# Patient Record
Sex: FEMALE | Race: WHITE | Employment: PART TIME | URBAN - METROPOLITAN AREA
[De-identification: names, ages, dates, MRNs, and addresses within clinical notes are randomized per-mention and may not be internally consistent; named-entity substitution may affect disease eponyms.]

---

## 2017-01-03 ENCOUNTER — TELEPHONE (OUTPATIENT)
Dept: INTERNAL MEDICINE CLINIC | Facility: CLINIC | Age: 47
End: 2017-01-03

## 2017-01-03 RX ORDER — METRONIDAZOLE 7.5 MG/G
1 GEL VAGINAL NIGHTLY
Qty: 1 TUBE | Refills: 0 | Status: SHIPPED | OUTPATIENT
Start: 2017-01-03 | End: 2017-01-08

## 2017-01-03 NOTE — TELEPHONE ENCOUNTER
Patient aware of pap results from 12/29/16 and [facility] recommendation. HPV results in process. ..

## 2017-01-25 ENCOUNTER — APPOINTMENT (OUTPATIENT)
Dept: LAB | Age: 47
End: 2017-01-25
Attending: FAMILY MEDICINE
Payer: COMMERCIAL

## 2017-01-25 ENCOUNTER — HOSPITAL ENCOUNTER (OUTPATIENT)
Dept: MAMMOGRAPHY | Age: 47
Discharge: HOME | End: 2017-01-25
Attending: FAMILY MEDICINE
Payer: COMMERCIAL

## 2017-01-25 ENCOUNTER — OFFICE VISIT (OUTPATIENT)
Dept: INTERNAL MEDICINE CLINIC | Facility: CLINIC | Age: 47
End: 2017-01-25

## 2017-01-25 VITALS
TEMPERATURE: 98 F | BODY MASS INDEX: 31 KG/M2 | DIASTOLIC BLOOD PRESSURE: 74 MMHG | WEIGHT: 174.5 LBS | SYSTOLIC BLOOD PRESSURE: 102 MMHG | OXYGEN SATURATION: 99 % | HEART RATE: 64 BPM

## 2017-01-25 DIAGNOSIS — Z12.31 ENCOUNTER FOR SCREENING MAMMOGRAM FOR BREAST CANCER: ICD-10-CM

## 2017-01-25 DIAGNOSIS — I10 ESSENTIAL HYPERTENSION: Primary | ICD-10-CM

## 2017-01-25 PROCEDURE — 84443 ASSAY THYROID STIM HORMONE: CPT | Performed by: FAMILY MEDICINE

## 2017-01-25 PROCEDURE — 85025 COMPLETE CBC W/AUTO DIFF WBC: CPT | Performed by: FAMILY MEDICINE

## 2017-01-25 PROCEDURE — 80061 LIPID PANEL: CPT | Performed by: FAMILY MEDICINE

## 2017-01-25 PROCEDURE — 83036 HEMOGLOBIN GLYCOSYLATED A1C: CPT | Performed by: FAMILY MEDICINE

## 2017-01-25 PROCEDURE — 82306 VITAMIN D 25 HYDROXY: CPT | Performed by: FAMILY MEDICINE

## 2017-01-25 PROCEDURE — 82607 VITAMIN B-12: CPT | Performed by: FAMILY MEDICINE

## 2017-01-25 PROCEDURE — 36415 COLL VENOUS BLD VENIPUNCTURE: CPT | Performed by: FAMILY MEDICINE

## 2017-01-25 PROCEDURE — 77067 SCR MAMMO BI INCL CAD: CPT

## 2017-01-25 PROCEDURE — 99213 OFFICE O/P EST LOW 20 MIN: CPT | Performed by: FAMILY MEDICINE

## 2017-01-25 PROCEDURE — 80053 COMPREHEN METABOLIC PANEL: CPT | Performed by: FAMILY MEDICINE

## 2017-01-25 NOTE — PROGRESS NOTES
CHIEF COMPLAINT:     Patient presents with:  Blood Pressure: Follow up. HPI:   Allen Caraballo is a 51year old female   Patient presents for recheck of her hypertension.  Pt has been taking medications as instructed, no medication side effects, home Breathing is non labored. CARDIO: RRR without murmur  EXTREMITIES: no cyanosis, clubbing or edema       ASSESSMENT AND PLAN:     Essential hypertension  (primary encounter diagnosis)    No orders of the defined types were placed in this encounter.        Pastora Yanez

## 2017-01-31 DIAGNOSIS — E55.9 VITAMIN D DEFICIENCY: Primary | ICD-10-CM

## 2017-01-31 DIAGNOSIS — D64.9 ANEMIA, UNSPECIFIED TYPE: ICD-10-CM

## 2017-01-31 RX ORDER — ERGOCALCIFEROL 1.25 MG/1
50000 CAPSULE ORAL WEEKLY
Qty: 12 CAPSULE | Refills: 1 | Status: SHIPPED | OUTPATIENT
Start: 2017-01-31 | End: 2017-03-02

## 2017-02-22 ENCOUNTER — TELEPHONE (OUTPATIENT)
Dept: INTERNAL MEDICINE CLINIC | Facility: CLINIC | Age: 47
End: 2017-02-22

## 2017-02-22 NOTE — TELEPHONE ENCOUNTER
Pt called to follow up on Triamterene. Pt reported dizzy spells for the last 3-4 days. Pt inquiring if needs ov to f/u or change of dose. Please advise.

## 2017-02-23 ENCOUNTER — APPOINTMENT (OUTPATIENT)
Dept: LAB | Age: 47
End: 2017-02-23
Attending: FAMILY MEDICINE
Payer: COMMERCIAL

## 2017-02-23 ENCOUNTER — OFFICE VISIT (OUTPATIENT)
Dept: INTERNAL MEDICINE CLINIC | Facility: CLINIC | Age: 47
End: 2017-02-23

## 2017-02-23 VITALS
BODY MASS INDEX: 31 KG/M2 | DIASTOLIC BLOOD PRESSURE: 76 MMHG | SYSTOLIC BLOOD PRESSURE: 116 MMHG | WEIGHT: 174.75 LBS | OXYGEN SATURATION: 98 % | HEART RATE: 75 BPM | TEMPERATURE: 98 F

## 2017-02-23 DIAGNOSIS — I10 ESSENTIAL HYPERTENSION: ICD-10-CM

## 2017-02-23 DIAGNOSIS — R42 DIZZINESS: Primary | ICD-10-CM

## 2017-02-23 LAB
BUN BLD-MCNC: 13 MG/DL (ref 8–20)
CALCIUM BLD-MCNC: 9.8 MG/DL (ref 8.3–10.3)
CHLORIDE: 102 MMOL/L (ref 101–111)
CO2: 28 MMOL/L (ref 22–32)
CREAT BLD-MCNC: 0.8 MG/DL (ref 0.55–1.02)
GLUCOSE BLD-MCNC: 83 MG/DL (ref 70–99)
POTASSIUM SERPL-SCNC: 3.6 MMOL/L (ref 3.6–5.1)
SODIUM SERPL-SCNC: 138 MMOL/L (ref 136–144)

## 2017-02-23 PROCEDURE — 36415 COLL VENOUS BLD VENIPUNCTURE: CPT | Performed by: FAMILY MEDICINE

## 2017-02-23 PROCEDURE — 99213 OFFICE O/P EST LOW 20 MIN: CPT | Performed by: FAMILY MEDICINE

## 2017-02-23 PROCEDURE — 80048 BASIC METABOLIC PNL TOTAL CA: CPT | Performed by: FAMILY MEDICINE

## 2017-02-23 RX ORDER — MECLIZINE HCL 12.5 MG/1
12.5 TABLET ORAL 3 TIMES DAILY PRN
Qty: 30 TABLET | Refills: 0 | Status: SHIPPED | OUTPATIENT
Start: 2017-02-23 | End: 2017-03-13

## 2017-02-23 RX ORDER — IBUPROFEN 200 MG
600 TABLET ORAL EVERY 6 HOURS PRN
Status: ON HOLD | COMMUNITY
End: 2020-10-19

## 2017-02-23 NOTE — PROGRESS NOTES
CHIEF COMPLAINT:     Patient presents with:  Medication Follow-Up      HPI:   Guicho Coates is a 51year old female   Patient presents for recheck of her hypertension.  Pt has been taking medications as instructed, no medication side effects, home BP yris TMs dull, canals patent,no cerumen impaction. Nose: normal turbinates and nasal mucosa. No sinus tenderness. Throat: clear. NECK: supple, non-tender  LUNGS: clear to auscultation bilaterally, no wheezes or rhonchi. Breathing is non labored.   CARDIO: RRR w

## 2017-03-13 ENCOUNTER — OFFICE VISIT (OUTPATIENT)
Dept: INTERNAL MEDICINE CLINIC | Facility: CLINIC | Age: 47
End: 2017-03-13

## 2017-03-13 VITALS
WEIGHT: 175.5 LBS | TEMPERATURE: 98 F | BODY MASS INDEX: 32 KG/M2 | OXYGEN SATURATION: 98 % | HEART RATE: 72 BPM | SYSTOLIC BLOOD PRESSURE: 110 MMHG | DIASTOLIC BLOOD PRESSURE: 70 MMHG

## 2017-03-13 DIAGNOSIS — I10 ESSENTIAL HYPERTENSION: Primary | ICD-10-CM

## 2017-03-13 PROCEDURE — 99213 OFFICE O/P EST LOW 20 MIN: CPT | Performed by: FAMILY MEDICINE

## 2017-03-13 NOTE — H&P
CHIEF COMPLAINT:     Patient presents with:  Blood Pressure      HPI:   Ventura Astudillo is a 51year old female   Patient presents for recheck of her  hypertension. Pt has been holding her Dyzaide since her last visit and her BP has been good.  Pt has had l Breathing is non labored. CARDIO: RRR without murmur  EXTREMITIES: no cyanosis, clubbing or edema       ASSESSMENT AND PLAN:     Essential hypertension  (primary encounter diagnosis)    No orders of the defined types were placed in this encounter.        [facility]

## 2017-07-10 ENCOUNTER — HOSPITAL ENCOUNTER (OUTPATIENT)
Age: 47
Discharge: HOME | End: 2017-07-10
Payer: COMMERCIAL

## 2017-07-10 VITALS
HEART RATE: 71 BPM | SYSTOLIC BLOOD PRESSURE: 143 MMHG | WEIGHT: 175 LBS | DIASTOLIC BLOOD PRESSURE: 87 MMHG | BODY MASS INDEX: 32.2 KG/M2 | TEMPERATURE: 98 F | HEIGHT: 62 IN | RESPIRATION RATE: 16 BRPM | OXYGEN SATURATION: 99 %

## 2017-07-10 DIAGNOSIS — K12.2 UVULITIS: Primary | ICD-10-CM

## 2017-07-10 DIAGNOSIS — R06.2 WHEEZING WITHOUT DIAGNOSIS OF ASTHMA: ICD-10-CM

## 2017-07-10 DIAGNOSIS — J01.40 ACUTE PANSINUSITIS, RECURRENCE NOT SPECIFIED: ICD-10-CM

## 2017-07-10 LAB — POCT RAPID STREP: NEGATIVE

## 2017-07-10 PROCEDURE — 87081 CULTURE SCREEN ONLY: CPT | Performed by: PHYSICIAN ASSISTANT

## 2017-07-10 PROCEDURE — 87147 CULTURE TYPE IMMUNOLOGIC: CPT | Performed by: PHYSICIAN ASSISTANT

## 2017-07-10 PROCEDURE — 87430 STREP A AG IA: CPT | Performed by: PHYSICIAN ASSISTANT

## 2017-07-10 PROCEDURE — 94640 AIRWAY INHALATION TREATMENT: CPT

## 2017-07-10 PROCEDURE — 99214 OFFICE O/P EST MOD 30 MIN: CPT

## 2017-07-10 PROCEDURE — 99204 OFFICE O/P NEW MOD 45 MIN: CPT

## 2017-07-10 RX ORDER — ACETAMINOPHEN AND CODEINE PHOSPHATE 120; 12 MG/5ML; MG/5ML
10 SOLUTION ORAL EVERY 6 HOURS PRN
Qty: 200 ML | Refills: 0 | Status: SHIPPED | OUTPATIENT
Start: 2017-07-10 | End: 2018-07-17

## 2017-07-10 RX ORDER — ALBUTEROL SULFATE 90 UG/1
2 AEROSOL, METERED RESPIRATORY (INHALATION) EVERY 4 HOURS PRN
Qty: 1 INHALER | Refills: 0 | Status: SHIPPED | OUTPATIENT
Start: 2017-07-10 | End: 2017-08-09

## 2017-07-10 RX ORDER — PREDNISONE 20 MG/1
40 TABLET ORAL DAILY
Qty: 8 TABLET | Refills: 0 | Status: SHIPPED | OUTPATIENT
Start: 2017-07-10 | End: 2017-07-14

## 2017-07-10 RX ORDER — IPRATROPIUM BROMIDE AND ALBUTEROL SULFATE 2.5; .5 MG/3ML; MG/3ML
3 SOLUTION RESPIRATORY (INHALATION) ONCE
Status: COMPLETED | OUTPATIENT
Start: 2017-07-10 | End: 2017-07-10

## 2017-07-10 RX ORDER — DEXAMETHASONE SODIUM PHOSPHATE 4 MG/ML
10 VIAL (ML) INJECTION ONCE
Status: COMPLETED | OUTPATIENT
Start: 2017-07-10 | End: 2017-07-10

## 2017-07-10 RX ORDER — AZITHROMYCIN 250 MG/1
TABLET, FILM COATED ORAL
Qty: 1 PACKAGE | Refills: 0 | Status: SHIPPED | OUTPATIENT
Start: 2017-07-10 | End: 2017-07-15

## 2017-07-10 NOTE — ED PROVIDER NOTES
Patient Seen in: [facility]    History   Patient presents with:  Cough    Stated Complaint: COUGH/CONGESTION/ST/HA X 2 WKS    HPI    50 yo female here with c/o sore throat, sinus pain/pressure in tandem with headache and cough x noted in HPI. Constitutional and vital signs reviewed. All other systems reviewed and negative except as noted above. PSFH elements reviewed from today and agreed except as otherwise stated in HPI.     Physical Exam   ED Triage Vitals  BP: 133/95 [ NOTE: PT has opened up after treatment in NAD  ============================================================  ED Course  ------------------------------------------------------------  [facility]     Clinical Impression:uvulitis/wheezing/sinusitis  Course of Servando

## 2017-07-10 NOTE — ED INITIAL ASSESSMENT (HPI)
Cough - x 2 weeks, started with cold symptoms. Pt  Has been taking mucinex and ibuprofen. Denies fever. Pt states she was sick since June 28 and then went on vacation. Pt c/o headache, sore throat, cough with phlegm. Chest congestion.  Pt has h/o bronchitis

## 2017-07-12 NOTE — ED NOTES
Throat culture received, positive Strep-not group A. Given Z-pack. Spoke w/ Dr Gregorio Todd. NO further action required.

## 2018-07-17 ENCOUNTER — OFFICE VISIT (OUTPATIENT)
Dept: INTERNAL MEDICINE CLINIC | Facility: CLINIC | Age: 48
End: 2018-07-17
Payer: COMMERCIAL

## 2018-07-17 VITALS
OXYGEN SATURATION: 98 % | RESPIRATION RATE: 16 BRPM | DIASTOLIC BLOOD PRESSURE: 86 MMHG | WEIGHT: 201.5 LBS | HEART RATE: 88 BPM | SYSTOLIC BLOOD PRESSURE: 150 MMHG | BODY MASS INDEX: 37 KG/M2 | TEMPERATURE: 99 F

## 2018-07-17 DIAGNOSIS — I10 ESSENTIAL HYPERTENSION: Primary | ICD-10-CM

## 2018-07-17 DIAGNOSIS — H72.92 PERFORATED EAR DRUM, LEFT: ICD-10-CM

## 2018-07-17 DIAGNOSIS — J06.9 RECENT URI: ICD-10-CM

## 2018-07-17 PROCEDURE — 99214 OFFICE O/P EST MOD 30 MIN: CPT | Performed by: FAMILY MEDICINE

## 2018-07-17 RX ORDER — OFLOXACIN 3 MG/ML
5 SOLUTION AURICULAR (OTIC) 2 TIMES DAILY
Refills: 0 | COMMUNITY
Start: 2018-07-03 | End: 2018-08-04 | Stop reason: ALTCHOICE

## 2018-07-17 RX ORDER — TRIAMTERENE AND HYDROCHLOROTHIAZIDE 37.5; 25 MG/1; MG/1
1 CAPSULE ORAL EVERY MORNING
Qty: 30 CAPSULE | Refills: 0 | Status: SHIPPED | OUTPATIENT
Start: 2018-07-17 | End: 2018-08-04

## 2018-07-17 RX ORDER — AZITHROMYCIN 250 MG/1
1 TABLET, FILM COATED ORAL AS DIRECTED
Refills: 0 | COMMUNITY
Start: 2018-07-03 | End: 2018-08-04 | Stop reason: ALTCHOICE

## 2018-07-17 NOTE — PROGRESS NOTES
CHIEF COMPLAINT:   Patient presents with:  URI: since 6/29/18. Tympanic Membrane Perforation: Left ear~ Went to urgent care 7/3/18 in [place], [place] and was Rx'ed Z-slim x 5 days and Ofloxacin Otic Solution 0.3% x 10 days.        HPI:   The patient presents well otherwise,  normal appetite  SKIN: no rashes or abnormal skin lesions  HEENT: See HPI  LUNGS: denies shortness of breath or wheezing, See HPI  CARDIOVASCULAR: denies chest pain or palpitations   GI: denies N/V/C or abdominal pain  NEURO: occ sinus hea (DYAZIDE) 37.5-25 MG Oral Cap; Take 1 capsule by mouth every morning. Dispense: 30 capsule; Refill: 0    2. Recent URI  - resolved. Pt can use claritin or zyrtec for her allergies and flonase for the congestion as needed.   - azithromycin 250 MG Oral Tab;

## 2018-08-04 ENCOUNTER — OFFICE VISIT (OUTPATIENT)
Dept: INTERNAL MEDICINE CLINIC | Facility: CLINIC | Age: 48
End: 2018-08-04
Payer: COMMERCIAL

## 2018-08-04 VITALS
BODY MASS INDEX: 37 KG/M2 | DIASTOLIC BLOOD PRESSURE: 82 MMHG | RESPIRATION RATE: 16 BRPM | SYSTOLIC BLOOD PRESSURE: 118 MMHG | HEART RATE: 66 BPM | OXYGEN SATURATION: 99 % | TEMPERATURE: 99 F | WEIGHT: 200 LBS

## 2018-08-04 DIAGNOSIS — M79.602 LEFT ARM PAIN: ICD-10-CM

## 2018-08-04 DIAGNOSIS — I10 ESSENTIAL HYPERTENSION: Primary | ICD-10-CM

## 2018-08-04 PROCEDURE — 99214 OFFICE O/P EST MOD 30 MIN: CPT | Performed by: FAMILY MEDICINE

## 2018-08-04 RX ORDER — TRIAMTERENE AND HYDROCHLOROTHIAZIDE 37.5; 25 MG/1; MG/1
1 CAPSULE ORAL EVERY MORNING
Qty: 30 CAPSULE | Refills: 5 | Status: SHIPPED | OUTPATIENT
Start: 2018-08-04 | End: 2019-02-26

## 2018-08-04 NOTE — PROGRESS NOTES
CHIEF COMPLAINT:     Patient presents with:  Blood Pressure: follow up  Other: left arm pain recently- pt has history of shingles      HPI:   Gabi Manning is a 51year old female   Patient presents for recheck of her hypertension.  Pt has been taking med or double vision  HENT: Denies congestion, rhinorrhea, sore throat or ear pain  CHEST: Denies chest pain, or palpitations  LUNGS: Denies shortness of breath, cough, or wheezing  NEURO: see HPI      EXAM:   /82   Pulse 66   Temp 98.7 °F (37.1 °C) (Ora

## 2018-11-16 ENCOUNTER — OFFICE VISIT (OUTPATIENT)
Dept: INTERNAL MEDICINE CLINIC | Facility: CLINIC | Age: 48
End: 2018-11-16
Payer: COMMERCIAL

## 2018-11-16 VITALS
WEIGHT: 205.25 LBS | BODY MASS INDEX: 38 KG/M2 | DIASTOLIC BLOOD PRESSURE: 80 MMHG | TEMPERATURE: 98 F | RESPIRATION RATE: 16 BRPM | HEART RATE: 82 BPM | OXYGEN SATURATION: 97 % | SYSTOLIC BLOOD PRESSURE: 112 MMHG

## 2018-11-16 DIAGNOSIS — Z12.31 ENCOUNTER FOR SCREENING MAMMOGRAM FOR MALIGNANT NEOPLASM OF BREAST: ICD-10-CM

## 2018-11-16 DIAGNOSIS — Z00.00 LABORATORY EXAMINATION ORDERED AS PART OF A ROUTINE GENERAL MEDICAL EXAMINATION: Primary | ICD-10-CM

## 2018-11-16 PROCEDURE — 99213 OFFICE O/P EST LOW 20 MIN: CPT | Performed by: FAMILY MEDICINE

## 2018-11-16 NOTE — PROGRESS NOTES
CHIEF COMPLAINT:     Patient presents with:  Medication Follow-Up      HPI:   Brown Ibarra is a 51year old female   Patient presents for recheck of her hypertension.  Pt has been taking medications as instructed, no medication side effects, home BP yris lightheadedness      EXAM:   /80 (BP Location: Right arm, Patient Position: Sitting, Cuff Size: large)   Pulse 82   Temp 97.8 °F (36.6 °C) (Oral)   Resp 16   Wt 205 lb 4 oz   LMP 06/04/2018 (Approximate)   SpO2 97%   Breastfeeding?  No   BMI 37.54 kg/

## 2018-11-20 ENCOUNTER — APPOINTMENT (OUTPATIENT)
Dept: LAB | Age: 48
End: 2018-11-20
Attending: FAMILY MEDICINE
Payer: COMMERCIAL

## 2018-11-20 PROCEDURE — 83036 HEMOGLOBIN GLYCOSYLATED A1C: CPT | Performed by: FAMILY MEDICINE

## 2018-11-20 PROCEDURE — 80061 LIPID PANEL: CPT | Performed by: FAMILY MEDICINE

## 2018-11-20 PROCEDURE — 36415 COLL VENOUS BLD VENIPUNCTURE: CPT | Performed by: FAMILY MEDICINE

## 2018-11-20 PROCEDURE — 82306 VITAMIN D 25 HYDROXY: CPT | Performed by: FAMILY MEDICINE

## 2018-11-20 PROCEDURE — 80050 GENERAL HEALTH PANEL: CPT | Performed by: FAMILY MEDICINE

## 2018-11-20 PROCEDURE — 82607 VITAMIN B-12: CPT | Performed by: FAMILY MEDICINE

## 2018-11-21 ENCOUNTER — TELEPHONE (OUTPATIENT)
Dept: INTERNAL MEDICINE CLINIC | Facility: CLINIC | Age: 48
End: 2018-11-21

## 2018-11-26 NOTE — TELEPHONE ENCOUNTER
Patient calling for test results; per triage aware they keep missing each other; informed patient nurse will call right back; patient understood and confirmed at home rest of day

## 2018-11-27 DIAGNOSIS — E55.9 VITAMIN D DEFICIENCY: Primary | ICD-10-CM

## 2018-11-27 DIAGNOSIS — E78.5 SERUM LIPIDS HIGH: ICD-10-CM

## 2018-11-27 RX ORDER — ERGOCALCIFEROL 1.25 MG/1
50000 CAPSULE ORAL WEEKLY
Qty: 12 CAPSULE | Refills: 1 | Status: SHIPPED | OUTPATIENT
Start: 2018-11-27 | End: 2019-05-14

## 2019-02-26 DIAGNOSIS — I10 ESSENTIAL HYPERTENSION: ICD-10-CM

## 2019-02-26 RX ORDER — TRIAMTERENE AND HYDROCHLOROTHIAZIDE 37.5; 25 MG/1; MG/1
1 CAPSULE ORAL EVERY MORNING
Qty: 30 CAPSULE | Refills: 4 | Status: SHIPPED | OUTPATIENT
Start: 2019-02-26 | End: 2019-09-03

## 2019-02-26 NOTE — TELEPHONE ENCOUNTER
Triameterene approved per protocol  Last OV relevant to medication: 11-16-18  Last refill date: 8-4-18  #/refills: 5  When pt was asked to return for OV: 6 months  Upcoming appt/reason: none  Recent labs: 11-20-18: Vit. B12/ HgBA1C/ Lipid/ Vit. D/ Tyroid/ CM

## 2019-08-04 DIAGNOSIS — I10 ESSENTIAL HYPERTENSION: ICD-10-CM

## 2019-08-05 NOTE — TELEPHONE ENCOUNTER
Lm for pt to return phone call to schedule CPX + B/u f/u. Triamterene-hctz failed protocol due to  Hypertension Medications Protocol Failed8/4 1:33 AM   Appointment in past 6 or next 3 months   Last OV relevant to medication: 11-16-18  Last refill date: 2-26-19  #/refills: 4  When pt was asked to return for OV: 6 mo.    Upcoming appt/reason: none  Recent labs: 11-20-18: CMP

## 2019-08-07 RX ORDER — TRIAMTERENE AND HYDROCHLOROTHIAZIDE 37.5; 25 MG/1; MG/1
1 CAPSULE ORAL EVERY MORNING
Qty: 30 CAPSULE | Refills: 3 | OUTPATIENT
Start: 2019-08-07

## 2019-09-03 ENCOUNTER — OFFICE VISIT (OUTPATIENT)
Dept: INTERNAL MEDICINE CLINIC | Facility: CLINIC | Age: 49
End: 2019-09-03

## 2019-09-03 VITALS
RESPIRATION RATE: 16 BRPM | TEMPERATURE: 99 F | HEIGHT: 62 IN | BODY MASS INDEX: 36.34 KG/M2 | WEIGHT: 197.5 LBS | HEART RATE: 80 BPM | DIASTOLIC BLOOD PRESSURE: 86 MMHG | SYSTOLIC BLOOD PRESSURE: 120 MMHG | OXYGEN SATURATION: 98 %

## 2019-09-03 DIAGNOSIS — S16.1XXA STRAIN OF NECK MUSCLE, INITIAL ENCOUNTER: ICD-10-CM

## 2019-09-03 DIAGNOSIS — M25.532 LEFT WRIST PAIN: Primary | ICD-10-CM

## 2019-09-03 DIAGNOSIS — N95.1 MENOPAUSAL SYMPTOM: ICD-10-CM

## 2019-09-03 DIAGNOSIS — I10 ESSENTIAL HYPERTENSION: ICD-10-CM

## 2019-09-03 PROCEDURE — 99213 OFFICE O/P EST LOW 20 MIN: CPT | Performed by: FAMILY MEDICINE

## 2019-09-03 RX ORDER — TRIAMTERENE AND HYDROCHLOROTHIAZIDE 37.5; 25 MG/1; MG/1
1 CAPSULE ORAL EVERY MORNING
Qty: 90 CAPSULE | Refills: 0 | Status: SHIPPED | OUTPATIENT
Start: 2019-09-03 | End: 2019-11-28

## 2019-09-03 RX ORDER — NAPROXEN 500 MG/1
500 TABLET ORAL 2 TIMES DAILY WITH MEALS
Qty: 60 TABLET | Refills: 0 | Status: SHIPPED | OUTPATIENT
Start: 2019-09-03 | End: 2020-08-31

## 2019-09-03 NOTE — PROGRESS NOTES
CHIEF COMPLAINT:     Patient presents with:  Hypertension: Bp check. Pt does not check Bp. Wrist Pain: Left pain comes and goes, has been going on for about 2 months. Pt does not remember any injuries, states she cannot really bend it without any pain.  P daily with meals. Disp: 60 tablet Rfl: 0   ibuprofen 200 MG Oral Tab Take 600 mg by mouth every 6 (six) hours as needed for Pain (for headaches. ).  Disp:  Rfl:       Past Medical History:   Diagnosis Date   • Family problems 07/07/2011   • Herpes zoster wit mild  Bruising: none  Skin intact:  +  Normal sensation: +  Normal capillary refill: +  ROM:  Wrist decreased due to pain.  Normal flexion, extension  Point Tenderness: No diffuse along the ulnar side, mid forearm to the distal wrist  Radial pulses:  +2 and encounter  - Rest,ice, moist heat as needed  - naproxen 500 MG Oral Tab; Take 1 tablet (500 mg total) by mouth 2 (two) times daily with meals. Dispense: 60 tablet; Refill: 0    The patient indicates understanding of these issues and agrees to the plan.   Ella Hill

## 2019-11-28 DIAGNOSIS — I10 ESSENTIAL HYPERTENSION: ICD-10-CM

## 2019-11-29 NOTE — TELEPHONE ENCOUNTER
Voicemail box states full name, left detailed message stating she is due for CPX and lab work. If pt needs refill we can only do a partial. If needed she needs to schedule a CPX.      Triamterene-hctz 37.5-25 Mg failed protocol due to  Hypertension Medications Protocol Bwclee70/28 3:42 AM   CMP or BMP in past 12 months   Last OV relevant to medication: 9-3-19  Last refill date: 9-3-19 #/refills: 0  When pt was asked to return for OV: next several weeks for CPX  Upcoming appt/reason: none  Recent labs: none

## 2019-12-03 RX ORDER — TRIAMTERENE AND HYDROCHLOROTHIAZIDE 37.5; 25 MG/1; MG/1
1 CAPSULE ORAL EVERY MORNING
Qty: 30 CAPSULE | Refills: 0 | Status: SHIPPED
Start: 2019-12-03 | End: 2020-05-18

## 2020-02-04 ENCOUNTER — OFFICE VISIT (OUTPATIENT)
Dept: INTERNAL MEDICINE CLINIC | Facility: CLINIC | Age: 50
End: 2020-02-04

## 2020-02-04 VITALS
WEIGHT: 202.75 LBS | SYSTOLIC BLOOD PRESSURE: 126 MMHG | RESPIRATION RATE: 16 BRPM | HEIGHT: 62 IN | OXYGEN SATURATION: 97 % | BODY MASS INDEX: 37.31 KG/M2 | DIASTOLIC BLOOD PRESSURE: 84 MMHG | HEART RATE: 88 BPM | TEMPERATURE: 98 F

## 2020-02-04 DIAGNOSIS — J02.9 SORE THROAT: Primary | ICD-10-CM

## 2020-02-04 DIAGNOSIS — J03.90 TONSILLITIS WITH EXUDATE: ICD-10-CM

## 2020-02-04 LAB
CONTROL LINE PRESENT WITH A CLEAR BACKGROUND (YES/NO): YES YES/NO
STREP GRP A CUL-SCR: NEGATIVE

## 2020-02-04 PROCEDURE — 87081 CULTURE SCREEN ONLY: CPT | Performed by: FAMILY MEDICINE

## 2020-02-04 PROCEDURE — 87880 STREP A ASSAY W/OPTIC: CPT | Performed by: FAMILY MEDICINE

## 2020-02-04 PROCEDURE — 99213 OFFICE O/P EST LOW 20 MIN: CPT | Performed by: FAMILY MEDICINE

## 2020-02-04 RX ORDER — CEFDINIR 300 MG/1
300 CAPSULE ORAL 2 TIMES DAILY
Qty: 20 CAPSULE | Refills: 0 | Status: SHIPPED | OUTPATIENT
Start: 2020-02-04 | End: 2020-08-31 | Stop reason: ALTCHOICE

## 2020-02-04 NOTE — PROGRESS NOTES
CHIEF COMPLAINT:   Patient presents with:  Sore Throat: started about a week ago, pt states she has sore throat, cough, and has phlegm. Pt has some sinus pressure and ears feel clogged. Pt did notice some white spots in the back of throat.  Son was Paul Dupree SYSTEMS:   GENERAL: feels well otherwise, slightly diminished appetite  SKIN: no rashes or abnormal skin lesions  HEENT: See HPI  LUNGS: denies wheezing, See HPI  CARDIOVASCULAR: denies chest pain or palpitations   GI: denies N/V/C or abdominal pain  NEURO salt water gargles and   Rest. Tylenol/Motrin.        Meds & Refills for this Visit:  Requested Prescriptions     Signed Prescriptions Disp Refills   • cefdinir 300 MG Oral Cap 20 capsule 0     Sig: Take 1 capsule (300 mg total) by mouth 2 (two) times daily

## 2020-02-06 ENCOUNTER — TELEPHONE (OUTPATIENT)
Dept: INTERNAL MEDICINE CLINIC | Facility: CLINIC | Age: 50
End: 2020-02-06

## 2020-02-06 RX ORDER — OSELTAMIVIR PHOSPHATE 75 MG/1
75 CAPSULE ORAL 2 TIMES DAILY
Qty: 10 CAPSULE | Refills: 0 | Status: SHIPPED | OUTPATIENT
Start: 2020-02-06 | End: 2020-02-11

## 2020-02-06 NOTE — TELEPHONE ENCOUNTER
Ok for tamiflu 75 mg one bid x 5 days.  Not sure if it will help since she hs had her symptoms for more than 3 days but she can try iy

## 2020-02-06 NOTE — TELEPHONE ENCOUNTER
Pt was in this week to see Silver Rayo, she was given an antibiotic for tonsillits however  was tested positive with the flu yesterday and she states she now has the same symptoms. .    She would like Savannah to prescribe  Tamflu?      Pt is waiting a return

## 2020-05-17 DIAGNOSIS — I10 ESSENTIAL HYPERTENSION: ICD-10-CM

## 2020-05-18 RX ORDER — TRIAMTERENE AND HYDROCHLOROTHIAZIDE 37.5; 25 MG/1; MG/1
1 CAPSULE ORAL EVERY MORNING
Qty: 30 CAPSULE | Refills: 0 | Status: SHIPPED | OUTPATIENT
Start: 2020-05-18 | End: 2020-07-30

## 2020-05-18 NOTE — TELEPHONE ENCOUNTER
Triamterene/Hydrochlorothiazide 37.5-25 Mg Cap Sand  Hypertension Medications Protocol Failed5/17 3:39 AM   CMP or BMP in past 12 months   LOV: 9/3/19  RTC: several weeks for cpx   FOV: none scheduled   Filled: 12/3/19 #30, 0 refills   Recent labs: 11/20/18

## 2020-07-30 DIAGNOSIS — I10 ESSENTIAL HYPERTENSION: ICD-10-CM

## 2020-07-30 RX ORDER — TRIAMTERENE AND HYDROCHLOROTHIAZIDE 37.5; 25 MG/1; MG/1
1 CAPSULE ORAL EVERY MORNING
Qty: 30 CAPSULE | Refills: 0 | Status: SHIPPED | OUTPATIENT
Start: 2020-07-30 | End: 2020-09-11

## 2020-07-30 NOTE — TELEPHONE ENCOUNTER
LOV: 2/4/2020 with Charlene Miranda NP  RTC: no follow-up on file  Last Relevant Labs: 11/20/2018   Filled: 5/18/2020  #30 with 0 refills    No future appointments.

## 2020-08-31 ENCOUNTER — OFFICE VISIT (OUTPATIENT)
Dept: INTERNAL MEDICINE CLINIC | Facility: CLINIC | Age: 50
End: 2020-08-31
Payer: COMMERCIAL

## 2020-08-31 VITALS
BODY MASS INDEX: 38.64 KG/M2 | HEART RATE: 86 BPM | SYSTOLIC BLOOD PRESSURE: 132 MMHG | HEIGHT: 62 IN | WEIGHT: 210 LBS | RESPIRATION RATE: 16 BRPM | DIASTOLIC BLOOD PRESSURE: 90 MMHG | OXYGEN SATURATION: 98 %

## 2020-08-31 DIAGNOSIS — L72.3 SEBACEOUS CYST: ICD-10-CM

## 2020-08-31 DIAGNOSIS — M67.442 GANGLION CYST OF FINGER OF LEFT HAND: Primary | ICD-10-CM

## 2020-08-31 DIAGNOSIS — I10 ESSENTIAL HYPERTENSION: ICD-10-CM

## 2020-08-31 PROCEDURE — 99213 OFFICE O/P EST LOW 20 MIN: CPT | Performed by: FAMILY MEDICINE

## 2020-08-31 PROCEDURE — 3008F BODY MASS INDEX DOCD: CPT | Performed by: FAMILY MEDICINE

## 2020-08-31 PROCEDURE — 3080F DIAST BP >= 90 MM HG: CPT | Performed by: FAMILY MEDICINE

## 2020-08-31 PROCEDURE — 3075F SYST BP GE 130 - 139MM HG: CPT | Performed by: FAMILY MEDICINE

## 2020-08-31 NOTE — PROGRESS NOTES
Nader Fernandez is a 45year old female.   HPI:   Here for few things    15 yr ago had a cyst removed from the R ear area   Came back in the last yr or so   Does leak a bit      No meds used   NT        Also L wrist is sore   Saw Tano Valdes last yr for it

## 2020-09-10 DIAGNOSIS — I10 ESSENTIAL HYPERTENSION: ICD-10-CM

## 2020-09-11 RX ORDER — TRIAMTERENE AND HYDROCHLOROTHIAZIDE 37.5; 25 MG/1; MG/1
1 CAPSULE ORAL EVERY MORNING
Qty: 30 CAPSULE | Refills: 0 | Status: SHIPPED | OUTPATIENT
Start: 2020-09-11 | End: 2020-10-15

## 2020-09-11 NOTE — TELEPHONE ENCOUNTER
LOV: 8/31/2020 with Dr. Tamika Trevizo  RTC: \"To RTC for px\"  Last Relevant Labs: 11/20/2018   Filled: 7/30/2020  #30 with 0 refills    Future Appointments   Date Time Provider Anisa Uriarte   9/14/2020  9:10 AM Megha Bautista MD MMO NP ORTHO [facility]   9/

## 2020-09-15 ENCOUNTER — HOSPITAL ENCOUNTER (OUTPATIENT)
Dept: CT IMAGING | Age: 50
Discharge: HOME | End: 2020-09-15
Attending: OTOLARYNGOLOGY
Payer: COMMERCIAL

## 2020-09-15 DIAGNOSIS — K11.8 PAROTID MASS: ICD-10-CM

## 2020-09-15 LAB — CREAT BLD-MCNC: 0.8 MG/DL (ref 0.55–1.02)

## 2020-09-15 PROCEDURE — 70491 CT SOFT TISSUE NECK W/DYE: CPT | Performed by: OTOLARYNGOLOGY

## 2020-09-15 PROCEDURE — 82565 ASSAY OF CREATININE: CPT

## 2020-10-07 DIAGNOSIS — I10 ESSENTIAL HYPERTENSION: ICD-10-CM

## 2020-10-07 RX ORDER — TRIAMTERENE AND HYDROCHLOROTHIAZIDE 37.5; 25 MG/1; MG/1
CAPSULE ORAL
Qty: 30 CAPSULE | Refills: 0 | OUTPATIENT
Start: 2020-10-07

## 2020-10-15 ENCOUNTER — EKG ENCOUNTER (OUTPATIENT)
Dept: LAB | Facility: HOSPITAL | Age: 50
End: 2020-10-15
Payer: COMMERCIAL

## 2020-10-15 ENCOUNTER — APPOINTMENT (OUTPATIENT)
Dept: LAB | Facility: HOSPITAL | Age: 50
End: 2020-10-15
Payer: COMMERCIAL

## 2020-10-15 DIAGNOSIS — D11.0 BENIGN NEOPLASM OF PAROTID GLAND: ICD-10-CM

## 2020-10-15 DIAGNOSIS — I10 ESSENTIAL HYPERTENSION: ICD-10-CM

## 2020-10-15 PROCEDURE — 93005 ELECTROCARDIOGRAM TRACING: CPT

## 2020-10-15 PROCEDURE — 80048 BASIC METABOLIC PNL TOTAL CA: CPT

## 2020-10-15 PROCEDURE — 36415 COLL VENOUS BLD VENIPUNCTURE: CPT

## 2020-10-15 PROCEDURE — 93010 ELECTROCARDIOGRAM REPORT: CPT | Performed by: INTERNAL MEDICINE

## 2020-10-15 RX ORDER — TRIAMTERENE AND HYDROCHLOROTHIAZIDE 37.5; 25 MG/1; MG/1
1 CAPSULE ORAL EVERY MORNING
Qty: 30 CAPSULE | Refills: 2 | Status: SHIPPED | OUTPATIENT
Start: 2020-10-15 | End: 2021-01-10

## 2020-10-16 ENCOUNTER — APPOINTMENT (OUTPATIENT)
Dept: LAB | Age: 50
End: 2020-10-16
Attending: OTOLARYNGOLOGY
Payer: COMMERCIAL

## 2020-10-19 ENCOUNTER — ANESTHESIA EVENT (OUTPATIENT)
Dept: SURGERY | Facility: HOSPITAL | Age: 50
End: 2020-10-19
Payer: COMMERCIAL

## 2020-10-19 ENCOUNTER — ANESTHESIA (OUTPATIENT)
Dept: SURGERY | Facility: HOSPITAL | Age: 50
End: 2020-10-19
Payer: COMMERCIAL

## 2020-10-19 ENCOUNTER — HOSPITAL ENCOUNTER (OUTPATIENT)
Facility: HOSPITAL | Age: 50
Setting detail: HOSPITAL OUTPATIENT SURGERY
Discharge: HOME | End: 2020-10-19
Attending: OTOLARYNGOLOGY | Admitting: OTOLARYNGOLOGY
Payer: COMMERCIAL

## 2020-10-19 VITALS
RESPIRATION RATE: 18 BRPM | SYSTOLIC BLOOD PRESSURE: 123 MMHG | HEART RATE: 80 BPM | TEMPERATURE: 98 F | BODY MASS INDEX: 38.75 KG/M2 | HEIGHT: 62 IN | OXYGEN SATURATION: 96 % | DIASTOLIC BLOOD PRESSURE: 86 MMHG | WEIGHT: 210.56 LBS

## 2020-10-19 DIAGNOSIS — D11.0 BENIGN NEOPLASM OF PAROTID GLAND: Primary | ICD-10-CM

## 2020-10-19 PROCEDURE — 0CB80ZZ EXCISION OF RIGHT PAROTID GLAND, OPEN APPROACH: ICD-10-PCS | Performed by: OTOLARYNGOLOGY

## 2020-10-19 PROCEDURE — 88305 TISSUE EXAM BY PATHOLOGIST: CPT | Performed by: OTOLARYNGOLOGY

## 2020-10-19 RX ORDER — ONDANSETRON 2 MG/ML
INJECTION INTRAMUSCULAR; INTRAVENOUS AS NEEDED
Status: DISCONTINUED | OUTPATIENT
Start: 2020-10-19 | End: 2020-10-19 | Stop reason: SURG

## 2020-10-19 RX ORDER — DIPHENHYDRAMINE HYDROCHLORIDE 50 MG/ML
12.5 INJECTION INTRAMUSCULAR; INTRAVENOUS AS NEEDED
Status: DISCONTINUED | OUTPATIENT
Start: 2020-10-19 | End: 2020-10-19

## 2020-10-19 RX ORDER — SODIUM CHLORIDE, SODIUM LACTATE, POTASSIUM CHLORIDE, CALCIUM CHLORIDE 600; 310; 30; 20 MG/100ML; MG/100ML; MG/100ML; MG/100ML
INJECTION, SOLUTION INTRAVENOUS CONTINUOUS
Status: DISCONTINUED | OUTPATIENT
Start: 2020-10-19 | End: 2020-10-19

## 2020-10-19 RX ORDER — ONDANSETRON 2 MG/ML
4 INJECTION INTRAMUSCULAR; INTRAVENOUS AS NEEDED
Status: DISCONTINUED | OUTPATIENT
Start: 2020-10-19 | End: 2020-10-19

## 2020-10-19 RX ORDER — MEPERIDINE HYDROCHLORIDE 25 MG/ML
12.5 INJECTION INTRAMUSCULAR; INTRAVENOUS; SUBCUTANEOUS AS NEEDED
Status: DISCONTINUED | OUTPATIENT
Start: 2020-10-19 | End: 2020-10-19

## 2020-10-19 RX ORDER — MIDAZOLAM HYDROCHLORIDE 1 MG/ML
1 INJECTION INTRAMUSCULAR; INTRAVENOUS EVERY 5 MIN PRN
Status: DISCONTINUED | OUTPATIENT
Start: 2020-10-19 | End: 2020-10-19

## 2020-10-19 RX ORDER — ACETAMINOPHEN 500 MG
1000 TABLET ORAL ONCE
Status: DISCONTINUED | OUTPATIENT
Start: 2020-10-19 | End: 2020-10-19 | Stop reason: HOSPADM

## 2020-10-19 RX ORDER — LIDOCAINE HYDROCHLORIDE AND EPINEPHRINE 10; 10 MG/ML; UG/ML
INJECTION, SOLUTION INFILTRATION; PERINEURAL AS NEEDED
Status: DISCONTINUED | OUTPATIENT
Start: 2020-10-19 | End: 2020-10-19 | Stop reason: HOSPADM

## 2020-10-19 RX ORDER — NALOXONE HYDROCHLORIDE 0.4 MG/ML
80 INJECTION, SOLUTION INTRAMUSCULAR; INTRAVENOUS; SUBCUTANEOUS AS NEEDED
Status: DISCONTINUED | OUTPATIENT
Start: 2020-10-19 | End: 2020-10-19

## 2020-10-19 RX ORDER — HYDROMORPHONE HYDROCHLORIDE 1 MG/ML
0.4 INJECTION, SOLUTION INTRAMUSCULAR; INTRAVENOUS; SUBCUTANEOUS EVERY 5 MIN PRN
Status: DISCONTINUED | OUTPATIENT
Start: 2020-10-19 | End: 2020-10-19

## 2020-10-19 RX ORDER — HYDROCODONE BITARTRATE AND ACETAMINOPHEN 5; 325 MG/1; MG/1
1 TABLET ORAL AS NEEDED
Status: DISCONTINUED | OUTPATIENT
Start: 2020-10-19 | End: 2020-10-19

## 2020-10-19 RX ORDER — EPHEDRINE SULFATE 50 MG/ML
INJECTION, SOLUTION INTRAVENOUS AS NEEDED
Status: DISCONTINUED | OUTPATIENT
Start: 2020-10-19 | End: 2020-10-19 | Stop reason: SURG

## 2020-10-19 RX ORDER — GLYCOPYRROLATE 0.2 MG/ML
INJECTION, SOLUTION INTRAMUSCULAR; INTRAVENOUS AS NEEDED
Status: DISCONTINUED | OUTPATIENT
Start: 2020-10-19 | End: 2020-10-19 | Stop reason: SURG

## 2020-10-19 RX ORDER — LIDOCAINE HYDROCHLORIDE 10 MG/ML
INJECTION, SOLUTION EPIDURAL; INFILTRATION; INTRACAUDAL; PERINEURAL AS NEEDED
Status: DISCONTINUED | OUTPATIENT
Start: 2020-10-19 | End: 2020-10-19 | Stop reason: SURG

## 2020-10-19 RX ORDER — ACETAMINOPHEN 500 MG
1000 TABLET ORAL ONCE AS NEEDED
Status: DISCONTINUED | OUTPATIENT
Start: 2020-10-19 | End: 2020-10-19

## 2020-10-19 RX ORDER — METOCLOPRAMIDE HYDROCHLORIDE 5 MG/ML
10 INJECTION INTRAMUSCULAR; INTRAVENOUS AS NEEDED
Status: DISCONTINUED | OUTPATIENT
Start: 2020-10-19 | End: 2020-10-19

## 2020-10-19 RX ORDER — DEXAMETHASONE SODIUM PHOSPHATE 4 MG/ML
VIAL (ML) INJECTION AS NEEDED
Status: DISCONTINUED | OUTPATIENT
Start: 2020-10-19 | End: 2020-10-19 | Stop reason: SURG

## 2020-10-19 RX ORDER — REMIFENTANIL HYDROCHLORIDE 1 MG/ML
INJECTION, POWDER, LYOPHILIZED, FOR SOLUTION INTRAVENOUS AS NEEDED
Status: DISCONTINUED | OUTPATIENT
Start: 2020-10-19 | End: 2020-10-19 | Stop reason: SURG

## 2020-10-19 RX ORDER — HYDROCODONE BITARTRATE AND ACETAMINOPHEN 5; 325 MG/1; MG/1
2 TABLET ORAL AS NEEDED
Status: DISCONTINUED | OUTPATIENT
Start: 2020-10-19 | End: 2020-10-19

## 2020-10-19 RX ORDER — HYDROCODONE BITARTRATE AND ACETAMINOPHEN 5; 325 MG/1; MG/1
1 TABLET ORAL EVERY 4 HOURS PRN
Qty: 20 TABLET | Refills: 0 | Status: SHIPPED | OUTPATIENT
Start: 2020-10-19 | End: 2020-10-29

## 2020-10-19 RX ORDER — LABETALOL HYDROCHLORIDE 5 MG/ML
5 INJECTION, SOLUTION INTRAVENOUS EVERY 5 MIN PRN
Status: DISCONTINUED | OUTPATIENT
Start: 2020-10-19 | End: 2020-10-19

## 2020-10-19 RX ADMIN — REMIFENTANIL HYDROCHLORIDE 50 MCG: 1 INJECTION, POWDER, LYOPHILIZED, FOR SOLUTION INTRAVENOUS at 09:57:00

## 2020-10-19 RX ADMIN — DEXAMETHASONE SODIUM PHOSPHATE 8 MG: 4 MG/ML VIAL (ML) INJECTION at 10:03:00

## 2020-10-19 RX ADMIN — GLYCOPYRROLATE 0.4 MG: 0.2 INJECTION, SOLUTION INTRAMUSCULAR; INTRAVENOUS at 10:10:00

## 2020-10-19 RX ADMIN — SODIUM CHLORIDE, SODIUM LACTATE, POTASSIUM CHLORIDE, CALCIUM CHLORIDE: 600; 310; 30; 20 INJECTION, SOLUTION INTRAVENOUS at 10:41:00

## 2020-10-19 RX ADMIN — REMIFENTANIL HYDROCHLORIDE 100 MCG: 1 INJECTION, POWDER, LYOPHILIZED, FOR SOLUTION INTRAVENOUS at 10:02:00

## 2020-10-19 RX ADMIN — EPHEDRINE SULFATE 10 MG: 50 INJECTION, SOLUTION INTRAVENOUS at 10:13:00

## 2020-10-19 RX ADMIN — ONDANSETRON 4 MG: 2 INJECTION INTRAMUSCULAR; INTRAVENOUS at 10:26:00

## 2020-10-19 RX ADMIN — LIDOCAINE HYDROCHLORIDE 50 MG: 10 INJECTION, SOLUTION EPIDURAL; INFILTRATION; INTRACAUDAL; PERINEURAL at 09:57:00

## 2020-10-19 RX ADMIN — SODIUM CHLORIDE, SODIUM LACTATE, POTASSIUM CHLORIDE, CALCIUM CHLORIDE: 600; 310; 30; 20 INJECTION, SOLUTION INTRAVENOUS at 09:53:00

## 2020-10-19 NOTE — INTERVAL H&P NOTE
Pre-op Diagnosis: BENIGN PAROTID NEOPLASM RIGHT    The above referenced H&P was reviewed by Ja Ward MD on 10/19/2020, the patient was examined and no significant changes have occurred in the patient's condition since the H&P was performed.   I di

## 2020-10-19 NOTE — ANESTHESIA PROCEDURE NOTES
Airway  Urgency: elective      General Information and Staff    Patient location during procedure: OR  Anesthesiologist: Brina Ash MD  Resident/CRNA: Naomi Contreras CRNA  Performed: CRNA     Indications and Patient Condition  Indications for ai

## 2020-10-19 NOTE — ANESTHESIA POSTPROCEDURE EVALUATION
[facility] Patient Status:  Outpatient in a Bed   Age/Gender 45year old female MRN [id number]   [facility] SURGERY Attending Silvia Verduzco MD   Hosp Day # 0 PCP Amanda Joshua MD       Anesthesia Post-op Note    P

## 2020-10-19 NOTE — BRIEF OP NOTE
Pre-Operative Diagnosis: BENIGN PAROTID NEOPLASM RIGHT     Post-Operative Diagnosis: BENIGN PAROTID NEOPLASM RIGHT      Procedure Performed:   Procedure(s):  EXCISION OF PAROTID TUMOR OF PAROTID GLAND, LATERAL LOBE, TOTAL WITH DISSECTION AND PRESERVATION O

## 2020-10-20 NOTE — OPERATIVE REPORT
[address]    PATIENT'S NAME: [facility]   ATTENDING PHYSICIAN: Severo Kalata, M.D. OPERATING PHYSICIAN: Severo Kalata, M.D.    PATIENT ACCOUNT#:   [de-identified]    LOCATION:  [address] [place] [address] [address]  MEDICAL RECORD #:   [id number] tumor, and there was none. The wound was copiously irrigated out with saline. There was no further sign of bleeding.   It was then closed with 4-0 Vicryl suture deep and then a running subcuticular 4-0 Prolene stitch along with Steri-Strips and a sterile

## 2020-10-24 NOTE — PROGRESS NOTES
HPI:   Babar Anthony is a 45year old female who presents for a complete physical exam. Symptoms: denies discharge, itching, burning or dysuria, is menopausal, last menses was 3 years ago.  Patient complains of occ dizziness when she lies down or does [place] : 202 lb 12 oz (92 kg)  09/03/19 : 197 lb 8 oz (89.6 kg)    Body mass index is 38.14 kg/m².      Lab Results   Component Value Date    GLU 98 10/15/2020    GLU 96 11/20/2018    GLU 83 02/23/2017     Lab Results   Component Value Date    CHOLEST 194 11/20/20 Drug use: No    Occ: mgr. : no. Children: 3.    Exercise: minimal.  Diet: watches minimally     REVIEW OF SYSTEMS:   GENERAL: feels well otherwise  SKIN: denies any unusual skin lesions  EYES:denies blurred vision or double vision  HEENT: denies alba screening colonoscopy. Pt info handouts given for: exercise, low fat diet and breast self-exam. Pt' s weight is Body mass index is 38.14 kg/m². , recommended low fat diet and aerobic exercise 30 minutes three times weekly.   The patient indicates understandi

## 2020-10-26 ENCOUNTER — OFFICE VISIT (OUTPATIENT)
Dept: INTERNAL MEDICINE CLINIC | Facility: CLINIC | Age: 50
End: 2020-10-26
Payer: COMMERCIAL

## 2020-10-26 VITALS
DIASTOLIC BLOOD PRESSURE: 74 MMHG | WEIGHT: 208.5 LBS | RESPIRATION RATE: 16 BRPM | SYSTOLIC BLOOD PRESSURE: 122 MMHG | HEIGHT: 62 IN | HEART RATE: 76 BPM | TEMPERATURE: 99 F | BODY MASS INDEX: 38.37 KG/M2

## 2020-10-26 DIAGNOSIS — Z23 NEED FOR INFLUENZA VACCINATION: ICD-10-CM

## 2020-10-26 DIAGNOSIS — Z00.00 ROUTINE GENERAL MEDICAL EXAMINATION AT A HEALTH CARE FACILITY: Primary | ICD-10-CM

## 2020-10-26 DIAGNOSIS — Z12.31 ENCOUNTER FOR SCREENING MAMMOGRAM FOR BREAST CANCER: ICD-10-CM

## 2020-10-26 DIAGNOSIS — Z01.419 ENCOUNTER FOR GYNECOLOGICAL EXAMINATION WITH PAPANICOLAOU SMEAR OF CERVIX: ICD-10-CM

## 2020-10-26 PROCEDURE — 99396 PREV VISIT EST AGE 40-64: CPT | Performed by: FAMILY MEDICINE

## 2020-10-26 PROCEDURE — 3008F BODY MASS INDEX DOCD: CPT | Performed by: FAMILY MEDICINE

## 2020-10-26 PROCEDURE — 87624 HPV HI-RISK TYP POOLED RSLT: CPT | Performed by: FAMILY MEDICINE

## 2020-10-26 PROCEDURE — 90471 IMMUNIZATION ADMIN: CPT | Performed by: FAMILY MEDICINE

## 2020-10-26 PROCEDURE — 3078F DIAST BP <80 MM HG: CPT | Performed by: FAMILY MEDICINE

## 2020-10-26 PROCEDURE — 88175 CYTOPATH C/V AUTO FLUID REDO: CPT | Performed by: FAMILY MEDICINE

## 2020-10-26 PROCEDURE — 3074F SYST BP LT 130 MM HG: CPT | Performed by: FAMILY MEDICINE

## 2020-10-26 PROCEDURE — 90686 IIV4 VACC NO PRSV 0.5 ML IM: CPT | Performed by: FAMILY MEDICINE

## 2020-11-03 ENCOUNTER — LAB REQUISITION (OUTPATIENT)
Dept: LAB | Facility: HOSPITAL | Age: 50
End: 2020-11-03
Payer: COMMERCIAL

## 2020-11-03 DIAGNOSIS — M67.432 GANGLION, LEFT WRIST: ICD-10-CM

## 2020-11-03 PROCEDURE — 88304 TISSUE EXAM BY PATHOLOGIST: CPT | Performed by: ORTHOPAEDIC SURGERY

## 2020-11-09 ENCOUNTER — HOSPITAL ENCOUNTER (OUTPATIENT)
Dept: MAMMOGRAPHY | Age: 50
Discharge: HOME | End: 2020-11-09
Attending: FAMILY MEDICINE
Payer: COMMERCIAL

## 2020-11-09 DIAGNOSIS — Z12.31 ENCOUNTER FOR SCREENING MAMMOGRAM FOR BREAST CANCER: ICD-10-CM

## 2020-11-09 PROCEDURE — 77063 BREAST TOMOSYNTHESIS BI: CPT | Performed by: FAMILY MEDICINE

## 2020-11-09 PROCEDURE — 77067 SCR MAMMO BI INCL CAD: CPT | Performed by: FAMILY MEDICINE

## 2020-11-20 ENCOUNTER — APPOINTMENT (OUTPATIENT)
Dept: LAB | Age: 50
End: 2020-11-20
Attending: INTERNAL MEDICINE
Payer: COMMERCIAL

## 2020-11-20 DIAGNOSIS — Z01.818 PRE-OP TESTING: ICD-10-CM

## 2020-11-23 PROBLEM — Z80.0 FH: COLON CANCER: Status: ACTIVE | Noted: 2020-11-23

## 2021-01-10 DIAGNOSIS — I10 ESSENTIAL HYPERTENSION: ICD-10-CM

## 2021-01-10 RX ORDER — TRIAMTERENE AND HYDROCHLOROTHIAZIDE 37.5; 25 MG/1; MG/1
1 CAPSULE ORAL EVERY MORNING
Qty: 30 CAPSULE | Refills: 0 | Status: SHIPPED | OUTPATIENT
Start: 2021-01-10 | End: 2021-02-07

## 2021-02-07 DIAGNOSIS — I10 ESSENTIAL HYPERTENSION: ICD-10-CM

## 2021-02-07 RX ORDER — TRIAMTERENE AND HYDROCHLOROTHIAZIDE 37.5; 25 MG/1; MG/1
1 CAPSULE ORAL EVERY MORNING
Qty: 30 CAPSULE | Refills: 2 | Status: SHIPPED | OUTPATIENT
Start: 2021-02-07 | End: 2021-06-25

## 2021-04-29 DIAGNOSIS — I10 ESSENTIAL HYPERTENSION: ICD-10-CM

## 2021-04-29 RX ORDER — TRIAMTERENE AND HYDROCHLOROTHIAZIDE 37.5; 25 MG/1; MG/1
1 CAPSULE ORAL EVERY MORNING
Qty: 30 CAPSULE | Refills: 0 | OUTPATIENT
Start: 2021-04-29

## 2021-06-25 ENCOUNTER — TELEPHONE (OUTPATIENT)
Dept: INTERNAL MEDICINE CLINIC | Facility: CLINIC | Age: 51
End: 2021-06-25

## 2021-06-25 DIAGNOSIS — I10 ESSENTIAL HYPERTENSION: ICD-10-CM

## 2021-06-25 RX ORDER — TRIAMTERENE AND HYDROCHLOROTHIAZIDE 37.5; 25 MG/1; MG/1
1 CAPSULE ORAL EVERY MORNING
Qty: 30 CAPSULE | Refills: 0 | Status: SHIPPED | OUTPATIENT
Start: 2021-06-25 | End: 2021-07-20

## 2021-06-25 NOTE — TELEPHONE ENCOUNTER
Patient called in advising the pharmacy denied her refill. Patient made an appointment to come in July 20th 2021 to see Madelin Romano for a medication f/u. The patient is currently out of town and cannot come into the office.  Patient is requesting refill on med

## 2021-07-20 ENCOUNTER — OFFICE VISIT (OUTPATIENT)
Dept: INTERNAL MEDICINE CLINIC | Facility: CLINIC | Age: 51
End: 2021-07-20
Payer: COMMERCIAL

## 2021-07-20 VITALS
HEART RATE: 65 BPM | DIASTOLIC BLOOD PRESSURE: 82 MMHG | BODY MASS INDEX: 37.32 KG/M2 | TEMPERATURE: 98 F | WEIGHT: 208 LBS | SYSTOLIC BLOOD PRESSURE: 120 MMHG | HEIGHT: 62.5 IN | OXYGEN SATURATION: 99 % | RESPIRATION RATE: 12 BRPM

## 2021-07-20 DIAGNOSIS — I10 ESSENTIAL HYPERTENSION: ICD-10-CM

## 2021-07-20 PROCEDURE — 3079F DIAST BP 80-89 MM HG: CPT | Performed by: FAMILY MEDICINE

## 2021-07-20 PROCEDURE — 99213 OFFICE O/P EST LOW 20 MIN: CPT | Performed by: FAMILY MEDICINE

## 2021-07-20 PROCEDURE — 3074F SYST BP LT 130 MM HG: CPT | Performed by: FAMILY MEDICINE

## 2021-07-20 PROCEDURE — 3008F BODY MASS INDEX DOCD: CPT | Performed by: FAMILY MEDICINE

## 2021-07-20 RX ORDER — TRIAMTERENE AND HYDROCHLOROTHIAZIDE 37.5; 25 MG/1; MG/1
1 CAPSULE ORAL EVERY MORNING
Qty: 90 CAPSULE | Refills: 0 | Status: SHIPPED | OUTPATIENT
Start: 2021-07-20 | End: 2021-10-20

## 2021-07-20 NOTE — PROGRESS NOTES
CHIEF COMPLAINT:     Patient presents with:  Medication Follow-Up      HPI:   Molly Dow is a 45year old female   Patient presents for recheck of their hypertension.  Pt has been taking medications as instructed, no medication side effects, home BP lesions  HEAD: atraumatic, normocephalic  EYES: conjunctiva clear, EOM intact, PERRLA  HEENT: ears,nose and throat clear  NECK: supple, non-tender  LUNGS: clear to auscultation bilaterally, no wheezes or rhonchi. Breathing is non labored.   CARDIO: RRR with

## 2021-07-24 DIAGNOSIS — I10 ESSENTIAL HYPERTENSION: ICD-10-CM

## 2021-07-24 RX ORDER — TRIAMTERENE AND HYDROCHLOROTHIAZIDE 37.5; 25 MG/1; MG/1
1 CAPSULE ORAL EVERY MORNING
Qty: 30 CAPSULE | Refills: 0 | OUTPATIENT
Start: 2021-07-24

## 2021-09-09 ENCOUNTER — HOSPITAL ENCOUNTER (OUTPATIENT)
Age: 51
Discharge: HOME | End: 2021-09-09
Payer: COMMERCIAL

## 2021-09-09 VITALS
TEMPERATURE: 99 F | BODY MASS INDEX: 38.64 KG/M2 | HEIGHT: 62 IN | DIASTOLIC BLOOD PRESSURE: 90 MMHG | OXYGEN SATURATION: 96 % | SYSTOLIC BLOOD PRESSURE: 141 MMHG | RESPIRATION RATE: 16 BRPM | WEIGHT: 210 LBS | HEART RATE: 79 BPM

## 2021-09-09 DIAGNOSIS — R21 RASH: Primary | ICD-10-CM

## 2021-09-09 PROCEDURE — 99203 OFFICE O/P NEW LOW 30 MIN: CPT

## 2021-09-09 PROCEDURE — 99213 OFFICE O/P EST LOW 20 MIN: CPT

## 2021-09-09 RX ORDER — METHYLPREDNISOLONE 4 MG/1
TABLET ORAL
Qty: 1 EACH | Refills: 0 | Status: SHIPPED | OUTPATIENT
Start: 2021-09-09 | End: 2021-09-16

## 2021-09-09 NOTE — ED INITIAL ASSESSMENT (HPI)
Pt presents tonight with c/o rash to her chest and bilateral legs since Wednesday morning. Pt denies using any new products. Pt states that she has had shingles in the past and she knows that is what this is.  Pt states that she has also been having left ey

## 2021-09-10 NOTE — ED PROVIDER NOTES
Patient Seen in: [facility]      History   Patient presents with:  Rash    Stated Complaint: c/o Shingles    HPI/Subjective:   HPI    CHIEF COMPLAINT: Rash     HISTORY OF PRESENT ILLNESS: Patient is a 42-year-old female who presents for e Alcohol use: Yes      Comment: Occasional    Drug use: Never             Review of Systems    Positive for stated complaint: c/o Shingles  Other systems are as noted in HPI. Constitutional and vital signs reviewed.       All other systems reviewed and nega contacts. She states she has had shingles in the past and this is similar, however on exam the rash is diffuse, spanning multiple dermatomes. There is no ulcerations or vesicles noted. More likely a contact dermatitis versus pityriasis rosea.   No herald

## 2021-09-13 ENCOUNTER — APPOINTMENT (OUTPATIENT)
Dept: GENERAL RADIOLOGY | Facility: HOSPITAL | Age: 51
DRG: 076 | End: 2021-09-13
Attending: EMERGENCY MEDICINE
Payer: COMMERCIAL

## 2021-09-13 ENCOUNTER — APPOINTMENT (OUTPATIENT)
Dept: CT IMAGING | Facility: HOSPITAL | Age: 51
DRG: 076 | End: 2021-09-13
Attending: EMERGENCY MEDICINE
Payer: COMMERCIAL

## 2021-09-13 ENCOUNTER — HOSPITAL ENCOUNTER (INPATIENT)
Facility: HOSPITAL | Age: 51
LOS: 3 days | Discharge: HOME | DRG: 076 | End: 2021-09-16
Attending: EMERGENCY MEDICINE | Admitting: HOSPITALIST
Payer: COMMERCIAL

## 2021-09-13 DIAGNOSIS — G03.9 MENINGITIS: Primary | ICD-10-CM

## 2021-09-13 LAB
ADENOVIRUS PCR:: NOT DETECTED
ALBUMIN SERPL-MCNC: 3.3 G/DL (ref 3.4–5)
ALBUMIN/GLOB SERPL: 0.6 {RATIO} (ref 1–2)
ALP LIVER SERPL-CCNC: 55 U/L
ALT SERPL-CCNC: 26 U/L
ANION GAP SERPL CALC-SCNC: 9 MMOL/L (ref 0–18)
AST SERPL-CCNC: 9 U/L (ref 15–37)
B PARAPERT DNA SPEC QL NAA+PROBE: NOT DETECTED
B PERT DNA SPEC QL NAA+PROBE: NOT DETECTED
BASOPHILS # BLD AUTO: 0.02 X10(3) UL (ref 0–0.2)
BASOPHILS NFR BLD AUTO: 0.2 %
BASOPHILS NFR CSF: 0 %
BILIRUB SERPL-MCNC: 0.6 MG/DL (ref 0.1–2)
BILIRUB UR QL STRIP.AUTO: NEGATIVE
BUN BLD-MCNC: 21 MG/DL (ref 7–18)
C PNEUM DNA SPEC QL NAA+PROBE: NOT DETECTED
CALCIUM BLD-MCNC: 11 MG/DL (ref 8.5–10.1)
CHLORIDE SERPL-SCNC: 100 MMOL/L (ref 98–112)
CLARITY CSF: CLEAR
CO2 SERPL-SCNC: 29 MMOL/L (ref 21–32)
COLOR CSF: COLORLESS
COLOR UR AUTO: YELLOW
CORONAVIRUS 229E PCR:: NOT DETECTED
CORONAVIRUS HKU1 PCR:: NOT DETECTED
CORONAVIRUS NL63 PCR:: NOT DETECTED
CORONAVIRUS OC43 PCR:: NOT DETECTED
COUNT PERFORMED ON TUBE: 4
CREAT BLD-MCNC: 0.93 MG/DL
EOSINOPHIL # BLD AUTO: 0.01 X10(3) UL (ref 0–0.7)
EOSINOPHIL NFR BLD AUTO: 0.1 %
EOSINOPHIL NFR CSF: 0 %
ERYTHROCYTE [DISTWIDTH] IN BLOOD BY AUTOMATED COUNT: 13.1 %
FLUAV RNA SPEC QL NAA+PROBE: NOT DETECTED
FLUBV RNA SPEC QL NAA+PROBE: NOT DETECTED
GLOBULIN PLAS-MCNC: 6 G/DL (ref 2.8–4.4)
GLUCOSE BLD-MCNC: 126 MG/DL (ref 70–99)
GLUCOSE CSF-MCNC: 64 MG/DL (ref 40–70)
GLUCOSE UR STRIP.AUTO-MCNC: NEGATIVE MG/DL
HCT VFR BLD AUTO: 38.4 %
HGB BLD-MCNC: 12.8 G/DL
IMM GRANULOCYTES # BLD AUTO: 0.06 X10(3) UL (ref 0–1)
IMM GRANULOCYTES NFR BLD: 0.5 %
LEUKOCYTE ESTERASE UR QL STRIP.AUTO: NEGATIVE
LYMPHOCYTES # BLD AUTO: 1.68 X10(3) UL (ref 1–4)
LYMPHOCYTES NFR BLD AUTO: 13.2 %
LYMPHOCYTES NFR CSF: 87 %
M PROTEIN MFR SERPL ELPH: 9.3 G/DL (ref 6.4–8.2)
MCH RBC QN AUTO: 32.9 PG (ref 26–34)
MCHC RBC AUTO-ENTMCNC: 33.3 G/DL (ref 31–37)
MCV RBC AUTO: 98.7 FL
METAPNEUMOVIRUS PCR:: NOT DETECTED
MONOCYTES # BLD AUTO: 0.83 X10(3) UL (ref 0.1–1)
MONOCYTES NFR BLD AUTO: 6.5 %
MONOS+MACROS NFR CSF: 1 %
MONOSCREEN: NEGATIVE
MYCOPLASMA PNEUMONIA PCR:: NOT DETECTED
NEUTROPHILS # BLD AUTO: 10.09 X10 (3) UL (ref 1.5–7.7)
NEUTROPHILS # BLD AUTO: 10.09 X10(3) UL (ref 1.5–7.7)
NEUTROPHILS NFR BLD AUTO: 79.5 %
NEUTROPHILS NFR CSF: 12 %
NITRITE UR QL STRIP.AUTO: NEGATIVE
OSMOLALITY SERPL CALC.SUM OF ELEC: 291 MOSM/KG (ref 275–295)
PARAINFLUENZA 1 PCR:: NOT DETECTED
PARAINFLUENZA 2 PCR:: NOT DETECTED
PARAINFLUENZA 3 PCR:: NOT DETECTED
PARAINFLUENZA 4 PCR:: NOT DETECTED
PH UR STRIP.AUTO: 5 [PH] (ref 5–8)
PLATELET # BLD AUTO: 198 10(3)UL (ref 150–450)
POTASSIUM SERPL-SCNC: 3.4 MMOL/L (ref 3.5–5.1)
PROT PATTERN CSF ELPH-IMP: 63.3 MG/DL (ref 15–45)
PROT UR STRIP.AUTO-MCNC: 100 MG/DL
PTH-INTACT SERPL-MCNC: 35.8 PG/ML (ref 18.5–88)
RBC # BLD AUTO: 3.89 X10(6)UL
RBC # CSF: 14 /MM3 (ref ?–1)
RHINOVIRUS/ENTERO PCR:: NOT DETECTED
RSV RNA SPEC QL NAA+PROBE: NOT DETECTED
SARS-COV-2 RNA NPH QL NAA+NON-PROBE: NOT DETECTED
SODIUM SERPL-SCNC: 138 MMOL/L (ref 136–145)
SP GR UR STRIP.AUTO: >1.03 (ref 1–1.03)
TOTAL CELLS COUNTED: 100
TOTAL VOLUME CSF: 11.5 ML
UROBILINOGEN UR STRIP.AUTO-MCNC: <2 MG/DL
WBC # BLD AUTO: 12.7 X10(3) UL (ref 4–11)
WBC # FLD MANUAL: 42 /MM3 (ref 0–5)

## 2021-09-13 PROCEDURE — 00JU3ZZ INSPECTION OF SPINAL CANAL, PERCUTANEOUS APPROACH: ICD-10-PCS | Performed by: EMERGENCY MEDICINE

## 2021-09-13 PROCEDURE — B01BZZZ FLUOROSCOPY OF SPINAL CORD: ICD-10-PCS | Performed by: RADIOLOGY

## 2021-09-13 PROCEDURE — 70450 CT HEAD/BRAIN W/O DYE: CPT | Performed by: EMERGENCY MEDICINE

## 2021-09-13 PROCEDURE — 71046 X-RAY EXAM CHEST 2 VIEWS: CPT | Performed by: EMERGENCY MEDICINE

## 2021-09-13 PROCEDURE — 009U3ZX DRAINAGE OF SPINAL CANAL, PERCUTANEOUS APPROACH, DIAGNOSTIC: ICD-10-PCS | Performed by: RADIOLOGY

## 2021-09-13 PROCEDURE — 62328 DX LMBR SPI PNXR W/FLUOR/CT: CPT | Performed by: EMERGENCY MEDICINE

## 2021-09-13 PROCEDURE — 99223 1ST HOSP IP/OBS HIGH 75: CPT | Performed by: HOSPITALIST

## 2021-09-13 RX ORDER — SODIUM CHLORIDE 9 MG/ML
INJECTION, SOLUTION INTRAVENOUS CONTINUOUS
Status: DISCONTINUED | OUTPATIENT
Start: 2021-09-13 | End: 2021-09-13

## 2021-09-13 RX ORDER — ACETAMINOPHEN 500 MG
1000 TABLET ORAL EVERY 6 HOURS PRN
Status: DISCONTINUED | OUTPATIENT
Start: 2021-09-13 | End: 2021-09-16

## 2021-09-13 RX ORDER — ONDANSETRON 2 MG/ML
4 INJECTION INTRAMUSCULAR; INTRAVENOUS EVERY 6 HOURS PRN
Status: DISCONTINUED | OUTPATIENT
Start: 2021-09-13 | End: 2021-09-16

## 2021-09-13 RX ORDER — VALACYCLOVIR HYDROCHLORIDE 500 MG/1
1000 TABLET, FILM COATED ORAL EVERY 8 HOURS SCHEDULED
Status: DISCONTINUED | OUTPATIENT
Start: 2021-09-13 | End: 2021-09-15

## 2021-09-13 RX ORDER — HYDROMORPHONE HYDROCHLORIDE 1 MG/ML
0.4 INJECTION, SOLUTION INTRAMUSCULAR; INTRAVENOUS; SUBCUTANEOUS EVERY 2 HOUR PRN
Status: DISCONTINUED | OUTPATIENT
Start: 2021-09-13 | End: 2021-09-16

## 2021-09-13 RX ORDER — SODIUM CHLORIDE 9 MG/ML
INJECTION, SOLUTION INTRAVENOUS CONTINUOUS
Status: DISCONTINUED | OUTPATIENT
Start: 2021-09-13 | End: 2021-09-16

## 2021-09-13 RX ORDER — POTASSIUM CHLORIDE 20 MEQ/1
20 TABLET, EXTENDED RELEASE ORAL ONCE
Status: COMPLETED | OUTPATIENT
Start: 2021-09-13 | End: 2021-09-13

## 2021-09-13 RX ORDER — ACETAMINOPHEN 500 MG
1000 TABLET ORAL ONCE
Status: COMPLETED | OUTPATIENT
Start: 2021-09-13 | End: 2021-09-13

## 2021-09-13 RX ORDER — HYDROMORPHONE HYDROCHLORIDE 1 MG/ML
0.5 INJECTION, SOLUTION INTRAMUSCULAR; INTRAVENOUS; SUBCUTANEOUS EVERY 30 MIN PRN
Status: DISCONTINUED | OUTPATIENT
Start: 2021-09-13 | End: 2021-09-13

## 2021-09-13 RX ORDER — PROCHLORPERAZINE EDISYLATE 5 MG/ML
5 INJECTION INTRAMUSCULAR; INTRAVENOUS EVERY 8 HOURS PRN
Status: DISCONTINUED | OUTPATIENT
Start: 2021-09-13 | End: 2021-09-14 | Stop reason: DRUGHIGH

## 2021-09-13 RX ORDER — HYDROMORPHONE HYDROCHLORIDE 1 MG/ML
0.8 INJECTION, SOLUTION INTRAMUSCULAR; INTRAVENOUS; SUBCUTANEOUS EVERY 2 HOUR PRN
Status: DISCONTINUED | OUTPATIENT
Start: 2021-09-13 | End: 2021-09-15

## 2021-09-13 RX ORDER — ENOXAPARIN SODIUM 100 MG/ML
40 INJECTION SUBCUTANEOUS DAILY
Status: DISCONTINUED | OUTPATIENT
Start: 2021-09-14 | End: 2021-09-16

## 2021-09-13 RX ORDER — ONDANSETRON 2 MG/ML
4 INJECTION INTRAMUSCULAR; INTRAVENOUS ONCE
Status: COMPLETED | OUTPATIENT
Start: 2021-09-13 | End: 2021-09-13

## 2021-09-13 RX ORDER — HYDROMORPHONE HYDROCHLORIDE 1 MG/ML
0.2 INJECTION, SOLUTION INTRAMUSCULAR; INTRAVENOUS; SUBCUTANEOUS EVERY 2 HOUR PRN
Status: DISCONTINUED | OUTPATIENT
Start: 2021-09-13 | End: 2021-09-16

## 2021-09-13 RX ORDER — ONDANSETRON 2 MG/ML
4 INJECTION INTRAMUSCULAR; INTRAVENOUS EVERY 4 HOURS PRN
Status: DISCONTINUED | OUTPATIENT
Start: 2021-09-13 | End: 2021-09-13

## 2021-09-13 RX ORDER — KETOROLAC TROMETHAMINE 30 MG/ML
30 INJECTION, SOLUTION INTRAMUSCULAR; INTRAVENOUS ONCE
Status: COMPLETED | OUTPATIENT
Start: 2021-09-13 | End: 2021-09-13

## 2021-09-13 RX ORDER — KETOROLAC TROMETHAMINE 30 MG/ML
30 INJECTION, SOLUTION INTRAMUSCULAR; INTRAVENOUS EVERY 6 HOURS PRN
Status: DISPENSED | OUTPATIENT
Start: 2021-09-13 | End: 2021-09-15

## 2021-09-13 RX ORDER — KETOROLAC TROMETHAMINE 15 MG/ML
15 INJECTION, SOLUTION INTRAMUSCULAR; INTRAVENOUS EVERY 6 HOURS PRN
Status: DISPENSED | OUTPATIENT
Start: 2021-09-13 | End: 2021-09-15

## 2021-09-13 RX ORDER — ONDANSETRON 2 MG/ML
4 INJECTION INTRAMUSCULAR; INTRAVENOUS
Status: COMPLETED | OUTPATIENT
Start: 2021-09-13 | End: 2021-09-13

## 2021-09-13 RX ORDER — ONDANSETRON 2 MG/ML
INJECTION INTRAMUSCULAR; INTRAVENOUS
Status: COMPLETED
Start: 2021-09-13 | End: 2021-09-13

## 2021-09-13 NOTE — ED QUICK NOTES
Patient and  informed for lp by dr David Looney ,.. pain meds given  Equipment gathered  .  Consent signed

## 2021-09-13 NOTE — ED INITIAL ASSESSMENT (HPI)
Pt has been ill for a few days went to urgent care and was started on steroids on sat.   Pt cont to have headache, fatigue nausea and vomiting

## 2021-09-13 NOTE — CONSULTS
INFECTIOUS DISEASE CONSULTATION    Celia De La Rosa Patient Status:  Emergency    1970 MRN [id number]   Location [address] Attending No att. providers found   Psychiatric Day # 0.9% NaCl infusion, , Intravenous, Continuous  •  HYDROmorphone HCl (DILAUDID) 1 MG/ML injection 0.5 mg, 0.5 mg, Intravenous, Q30 Min PRN  •  ondansetron (ZOFRAN) injection 4 mg, 4 mg, Intravenous, Q4H PRN  No current facility-administered medications on f Smear This is a cytocentrifuged smear.  N/A     Cell Count, CSF [[id number]] (Abnormal) Collected: 09/13/21 1152   Specimen: Cerebral spinal fluid from CSF, lumbar puncture Updated: 09/13/21 1312    Total Volume CSF 11.5 mL     CSF TUBE # 4    [facility]  [phone]

## 2021-09-13 NOTE — ED PROVIDER NOTES
Patient Seen in: [facility] Emergency Department      History   Patient presents with:  Nausea/Vomiting/Diarrhea  Dizziness    Stated Complaint: nvd, dizzy     Subjective:   HPI    Patient presented to urgent care 4 days ago.   Patient was complainin reviewed. All other systems reviewed and negative except as noted above.     Physical Exam     ED Triage Vitals [09/13/21 0747]   BP (!) 140/94   Pulse 88   Resp 20   Temp 99.4 °F (37.4 °C)   Temp src Temporal   SpO2 92 %   O2 Device None (Room air) 100  (*)     WBC Urine 6-10 (*)     RBC Urine 6-10 (*)     Bacteria Urine Rare (*)     Squamous Epi. Cells Few (*)     All other components within normal limits   PROTEIN, TOTAL, CSF - Abnormal; Notable for the following components:     Total Protein CSF 63 CBC W/ DIFFERENTIAL[[id number]]          Abnormal            Final result                 Please view results for these tests on the individual orders.    CELLCOUNT/DIFF CSF   EBV, CHRONIC/ACTIVE INFECTION   VIRAL ENCEPHALITIS PANEL, CSF    Narrative: negative  Respiratory panel negative for all including Covid  Urinalysis shows high specific gravity with ketones consistent with dehydration observe clinically.  There were negative nitrites and leukocytes    Chest x-ray  CONCLUSION:  No active cardiopulmo encounter diagnosis)     Disposition:  Admit  9/13/2021  2:05 pm    Follow-up:  No follow-up provider specified.         Medications Prescribed:  Current Discharge Medication List                          Hospital Problems             Present on Admission

## 2021-09-13 NOTE — PROGRESS NOTES
NURSING ADMISSION NOTE      Patient admitted via Cart  Oriented to room. Safety precautions initiated. Bed in low position. Call light in reach. Patient admitted in stable condition from ED. Admission navigator completed with patient.    Pt is AOx

## 2021-09-13 NOTE — PROCEDURES
[facility]  Procedure Note    Prudence Ace Patient Status:  Emergency    1970 MRN [id number]   Location [address] Attending Robyn Reed MD   Hosp Day # 0 PCP Jett Celis MD     FINDINGS:   LEVEL PUNCTURED: L

## 2021-09-13 NOTE — H&P
[facility]  History and Physical     Natalie Santos Patient Status:  Inpatient    1970 MRN [id number]   [facility] 5NW-A Attending Liya Mclaughlin MD   Hosp Day # 0 PCP Carri Zhou MD     Chief Complaint: ROSAS     History 0  Triamterene-HCTZ 37.5-25 MG Oral Cap, Take 1 capsule by mouth every morning., Disp: 90 capsule, Rfl: 0      Physical Exam:    BP (!) 159/98   Pulse 89   Temp 99.4 °F (37.4 °C) (Temporal)   Resp 19   SpO2 94%   General: No acute distress.  Alert and orien

## 2021-09-14 PROCEDURE — 99232 SBSQ HOSP IP/OBS MODERATE 35: CPT | Performed by: HOSPITALIST

## 2021-09-14 RX ORDER — PROCHLORPERAZINE EDISYLATE 5 MG/ML
10 INJECTION INTRAMUSCULAR; INTRAVENOUS EVERY 6 HOURS PRN
Status: DISCONTINUED | OUTPATIENT
Start: 2021-09-14 | End: 2021-09-16

## 2021-09-14 NOTE — PLAN OF CARE
Received patient alert and orientated. Oxygen WNL on room air-2L NC, . Pt with complaints of headaches, PRN pain medications given. Pt with complaints of nausea, medications given. Voiding. Up with assistance.  Instructed to call for help, call light wit

## 2021-09-14 NOTE — PLAN OF CARE
Pt A&Ox4. Sats > 90% on RA, Prn 2L needed after giving IV pain medications. Pt is photosensitive and light sensitive. Q8 vitals, have been stable. Lovenox. PRN pain and nausea medications given with some relief.  Pt is able to move around the room with SB. Progressing

## 2021-09-14 NOTE — PROGRESS NOTES
[facility]                INFECTIOUS DISEASE PROGRESS NOTE    Muna Reyes Patient Status:  Inpatient    1970 MRN [id number]   [facility] 5NW-A Attending Iliana Birch MD   Hosp Day # 1 PCP MD Emily Rainey (Preliminary result)    Collection Time: 09/13/21 11:52 AM    Specimen: CSF, lumbar puncture; Cerebral spinal fluid   Result Value Ref Range    CSF Culture Result No Growth at 18-24 hrs.  N/A    CSF Smear 2+ WBCs seen N/A    CSF Smear No organisms seen N/A

## 2021-09-14 NOTE — PROGRESS NOTES
[facility]  Progress Note     Prudence Ace Patient Status:  Inpatient    1970 MRN [id number]   [facility] 5NW-A Attending Tc Locke MD   Hosp Day # 1 PCP Jett Celis MD     Chief Complaint: headache   S:  Still v Imaging: Imaging data reviewed in Epic. Medications:   • enoxaparin  40 mg Subcutaneous Daily   • valACYclovir  1,000 mg Oral Q8H [address]     ASSESSMENT / PLAN:   1. Viral meningitis with photophobia   1. ID consult appreciated  2.  F/u LP Cx- enterovirus, W

## 2021-09-14 NOTE — PAYOR COMM NOTE
--------------  ADMISSION REVIEW     Payor: Omaira Simmons [facility] #:  [id number]  Authorization Number: [id number]       ED Provider Notes          History   Patient presents with:  Nausea/Vomiting/Diarrhea  Dizziness    Stated Co 0747]   BP (!) 140/94   Pulse 88   Resp 20   Temp 99.4 °F (37.4 °C)   Temp src Temporal   SpO2 92 %   O2 Device None (Room air)       Current:/84   Pulse 75   Temp 99.4 °F (37.4 °C) (Temporal)   Resp 18   SpO2 96%         Physical Exam  General: The other components within normal limits   PROTEIN, TOTAL, CSF - Abnormal; Notable for the following components:     Total Protein CSF 63.3 (*)     All other components within normal limits   CELL COUNT, CSF - Abnormal; Notable for the following components: x-ray  CONCLUSION:  No active cardiopulmonary process identified. CT brain  CONCLUSION:  No acute intracranial abnormality identified. I reviewed the results of the work-up with the patient.   As noted above, with her headache, stiff neck, and gurvinder any severe ear pain or rashes that look like herpes. Review of Systems:   A comprehensive 14 point review of systems was completed. Pertinent positives and negatives noted in the HPI.     Physical Exam:    BP (!) 159/98   Pulse 89   Temp 99.4 °F (37.4 role for antibitoics  Since HSV is pending reasonable to cover with po valtrex           9/14 HOSP    Still very nauseated but can keep down liquids. Still with HA     1. Viral meningitis with photophobia   1. ID consult appreciated  2.  F/u LP Cx- enterovi RN      Prochlorperazine Edisylate (COMPAZINE) injection 10 mg     Date Action Dose Route User    9/14/2021 1033 Given  Intravenous Jeronimo Jaquez RN      0.9% NaCl infusion 125 ml/hr    Date Action Dose Route User    9/13/2021 7082 Mak Dean

## 2021-09-15 LAB
ANION GAP SERPL CALC-SCNC: 7 MMOL/L (ref 0–18)
BUN BLD-MCNC: 8 MG/DL (ref 7–18)
CALCIUM BLD-MCNC: 9.9 MG/DL (ref 8.5–10.1)
CHLORIDE SERPL-SCNC: 104 MMOL/L (ref 98–112)
CO2 SERPL-SCNC: 27 MMOL/L (ref 21–32)
CREAT BLD-MCNC: 0.53 MG/DL
CRYPTOCOCCUS NEOFORMANS GATTII BY PCR: NOT DETECTED
CYTOMEGALVIRUS BY PCR: NOT DETECTED
EBV NA IGG SER QL IA: POSITIVE
EBV VCA IGG SER QL IA: POSITIVE
EBV VCA IGM SER QL IA: NEGATIVE
ENTEROVIRUS BY PCR: NOT DETECTED
ESCHERICHIA COLI K1 BY PCR: NOT DETECTED
GLUCOSE BLD-MCNC: 99 MG/DL (ref 70–99)
HAEMOPHILUS INFLUENZAE BY PCR: NOT DETECTED
HERPES SIMPLEX VIRUS 1 BY PCR: NOT DETECTED
HERPES SIMPLEX VIRUS 2 BY PCR: NOT DETECTED
HUMAN HERPESVIRUS 6 BY PCR: NOT DETECTED
HUMAN PARECHOVIRUS BY PCR: NOT DETECTED
LISTERIA MONOCYTOGENES BY PCR: NOT DETECTED
MAGNESIUM SERPL-MCNC: 2.3 MG/DL (ref 1.6–2.6)
NEISSERIA MENINGITIDIS BY PCR: NOT DETECTED
OSMOLALITY SERPL CALC.SUM OF ELEC: 284 MOSM/KG (ref 275–295)
POTASSIUM SERPL-SCNC: 2.8 MMOL/L (ref 3.5–5.1)
POTASSIUM SERPL-SCNC: 3.3 MMOL/L (ref 3.5–5.1)
SODIUM SERPL-SCNC: 138 MMOL/L (ref 136–145)
STREPTOCOCCUS AGALACTIAE BY PCR: NOT DETECTED
STREPTOCOCCUS PNEUMONIAE BY PCR: NOT DETECTED
VARICELLA ZOSTER VIRUS BY PCR: NOT DETECTED

## 2021-09-15 PROCEDURE — 99232 SBSQ HOSP IP/OBS MODERATE 35: CPT | Performed by: HOSPITALIST

## 2021-09-15 RX ORDER — TRAMADOL HYDROCHLORIDE 50 MG/1
50 TABLET ORAL EVERY 6 HOURS PRN
Status: DISCONTINUED | OUTPATIENT
Start: 2021-09-15 | End: 2021-09-16

## 2021-09-15 RX ORDER — ONDANSETRON 4 MG/1
4 TABLET, FILM COATED ORAL EVERY 8 HOURS PRN
Status: DISCONTINUED | OUTPATIENT
Start: 2021-09-15 | End: 2021-09-16

## 2021-09-15 RX ORDER — POTASSIUM CHLORIDE 20 MEQ/1
40 TABLET, EXTENDED RELEASE ORAL EVERY 4 HOURS
Status: COMPLETED | OUTPATIENT
Start: 2021-09-15 | End: 2021-09-15

## 2021-09-15 NOTE — PLAN OF CARE
Pt A&Ox4. Sats > 90% on RA. Pt is photosensitive, light sensitive and has a slight headache. Q8 vitals, have been stable. Lovenox. PRN oral pain and nausea medications given with some relief. Pt is able to move around the room with SB.  Reg diet pt is melissa goals for specific interventions  Outcome: Progressing     Problem: RISK FOR INFECTION - ADULT  Goal: Absence of fever/infection during anticipated neutropenic period  Description: INTERVENTIONS  - Monitor WBC  - Administer growth factors as ordered  - Imp

## 2021-09-15 NOTE — PROGRESS NOTES
[facility]                INFECTIOUS DISEASE PROGRESS NOTE    Reynaldo Stovall Patient Status:  Inpatient    1970 MRN [id number]   [facility] 5NW-A Attending Tree Washington MD   Hosp Day # 2 PCP Mojgan Murphy MD       Dairl Cooler 9.3*  --   --        No results found for: [facility] Encounter on 09/13/21   1.  CSF culture     Status: None (Preliminary result)    Collection Time: 09/13/21 11:52 AM    Specimen: CSF, lumbar puncture; Cerebral spinal fluid   Result

## 2021-09-15 NOTE — PLAN OF CARE
Problem: Meningitis   Data: Ax04. No tele.  , on room air overnight. Afebrile. Verbalized general pain , primarily headache. Light sensitivity. IV dilaudid and Toradol given. Regular diet , poor appetite. No complaints of nausea, no emesis. IVF. Up SBA.

## 2021-09-15 NOTE — PROGRESS NOTES
[facility]     Hospitalist Progress Note     Glorious Pinta Patient Status:  Inpatient    1970 MRN [id number]   [facility] 5NW-A Attending Fern Ngo MD   Hosp Day # 2 PCP Adi Sung MD     Chief Complaint: Romeo Dietrich Markers  No results for input(s): CRP, GEMMA, LDH, DDIMER in the last 168 hours. Imaging: Imaging data reviewed in Epic.     Medications:   • potassium chloride  40 mEq Oral Q4H   • enoxaparin  40 mg Subcutaneous Daily   • valACYclovir  1,000 mg Oral Q8H S

## 2021-09-16 VITALS
OXYGEN SATURATION: 97 % | DIASTOLIC BLOOD PRESSURE: 78 MMHG | WEIGHT: 205.5 LBS | RESPIRATION RATE: 16 BRPM | HEART RATE: 75 BPM | BODY MASS INDEX: 38 KG/M2 | TEMPERATURE: 98 F | SYSTOLIC BLOOD PRESSURE: 130 MMHG

## 2021-09-16 LAB
ENTEROVIRUS DETECTION BY RT-PC: NOT DETECTED
ENTEROVIRUS DETECTION BY RT-PC: NOT DETECTED

## 2021-09-16 PROCEDURE — 99239 HOSP IP/OBS DSCHRG MGMT >30: CPT | Performed by: HOSPITALIST

## 2021-09-16 RX ORDER — HYDROCODONE BITARTRATE AND ACETAMINOPHEN 10; 325 MG/1; MG/1
1 TABLET ORAL EVERY 4 HOURS PRN
Status: DISCONTINUED | OUTPATIENT
Start: 2021-09-16 | End: 2021-09-16

## 2021-09-16 RX ORDER — DOCUSATE SODIUM 100 MG/1
100 CAPSULE, LIQUID FILLED ORAL 2 TIMES DAILY
Qty: 30 CAPSULE | Refills: 0 | Status: SHIPPED | OUTPATIENT
Start: 2021-09-16

## 2021-09-16 RX ORDER — HYDROCODONE BITARTRATE AND ACETAMINOPHEN 10; 325 MG/1; MG/1
1 TABLET ORAL EVERY 4 HOURS PRN
Qty: 20 TABLET | Refills: 0 | Status: SHIPPED | OUTPATIENT
Start: 2021-09-16

## 2021-09-16 NOTE — PLAN OF CARE
Patient alert and oriented x4. Up with standby. No tele ordered. VSS. Maintaining o2 sats on RA. C/o severe headache, prn dilaudid given with relief. Patient with photosensitivity and light sensitivity. IVF infusing. PO Valtrex discontinued.  Updated patien

## 2021-09-16 NOTE — PROGRESS NOTES
[facility]     Hospitalist Progress Note     [facility] Patient Status:  Inpatient    1970 MRN [id number]   [facility] 5NW-A Attending Radha Cobos MD   Hosp Day # 3 PCP Anson Queen MD     Chief Complaint: meningitis input(s): CRP, GEMMA, LDH, DDIMER in the last 168 hours. Imaging: Imaging data reviewed in Epic. Medications:   • enoxaparin  40 mg Subcutaneous Daily       Assessment & Plan:    1.  Viral meningitis  · ID following  · WNV pending  · HSV and enterovirus neg

## 2021-09-16 NOTE — DISCHARGE SUMMARY
[facility] HOSPITALIST  DISCHARGE SUMMARY     Rain Avalos Patient Status:  Inpatient    1970 MRN [id number]   [facility] 5NW-A Attending No att. providers found   Hosp Day # 3 PCP Eduard Bullard MD     Date of Admission: 2021  Da START taking these medications      Instructions Prescription details   docusate sodium 100 MG Caps  Commonly known as: COLACE      Take 1 capsule (100 mg total) by mouth 2 (two) times daily.    Quantity: 30 capsule  Refills: 0     HYDROcodone-acetaminoph

## 2021-09-16 NOTE — DISCHARGE PLANNING
NURSING DISCHARGE NOTE    Discharged Home via Wheelchair. Accompanied by Spouse and Support staff  Belongings Taken by patient/family. PIV removed. Discharge navigator complete. Discharge instructions reviewed with patient & spouse at bedside.    All

## 2021-09-17 ENCOUNTER — TELEPHONE (OUTPATIENT)
Dept: INTERNAL MEDICINE CLINIC | Facility: CLINIC | Age: 51
End: 2021-09-17

## 2021-09-17 ENCOUNTER — PATIENT OUTREACH (OUTPATIENT)
Dept: CASE MANAGEMENT | Age: 51
End: 2021-09-17

## 2021-09-17 DIAGNOSIS — Z02.9 ENCOUNTERS FOR ADMINISTRATIVE PURPOSE: ICD-10-CM

## 2021-09-17 LAB
HSV 1 SUBTYPE BY PCR: NOT DETECTED
HSV 2 SUBTYPE BY PCR: NOT DETECTED
WEST NILE VIRUS AB IGG CSF: 1.14 IV
WEST NILE VIRUS AB IGM CSF: 4.64 IV

## 2021-09-17 NOTE — TELEPHONE ENCOUNTER
Kenyatta from THE [facility] lab would like to speak with the nurse of Dr. Carmelita Whitfield regarding some labs that were done at THE [facility] ER. Please advise.

## 2021-09-17 NOTE — TELEPHONE ENCOUNTER
Spoke with Tory at THE [facility] lab stating patient's [place] IGM lab result was high 4.64, range is less then 0.89. please advise.

## 2021-09-18 LAB
WEST NILE VIRUS AB IGG CSF: 0.47 IV
WEST NILE VIRUS AB IGM CSF: 4.31 IV

## 2021-09-20 NOTE — TELEPHONE ENCOUNTER
Spoke with patient stating continues to feel fatigue, continues to have pain but has improved a lot, as well as headache pain has improved. Patient is taking Hydrocodone which helps with pain and sleep, patient only has 5-6 tablets left. Please advise.

## 2021-09-20 NOTE — TELEPHONE ENCOUNTER
Spoke with patient advised symptoms should continue to improve in the next couple weeks, patient to continue present management. Patient verbalized understanding and agreeable to POC.         please reach out to patient to schedule f/u with TO.

## 2021-09-21 NOTE — TELEPHONE ENCOUNTER
Pt called back and scheduled -     Future Appointments   Date Time Provider Anisa Uriarte   9/23/2021  8:30 AM Rolando Estrada MD [facility] [place]

## 2021-09-23 ENCOUNTER — OFFICE VISIT (OUTPATIENT)
Dept: INTERNAL MEDICINE CLINIC | Facility: CLINIC | Age: 51
End: 2021-09-23
Payer: COMMERCIAL

## 2021-09-23 VITALS
OXYGEN SATURATION: 97 % | RESPIRATION RATE: 18 BRPM | BODY MASS INDEX: 36.22 KG/M2 | DIASTOLIC BLOOD PRESSURE: 88 MMHG | WEIGHT: 196.81 LBS | SYSTOLIC BLOOD PRESSURE: 108 MMHG | HEIGHT: 62 IN | HEART RATE: 84 BPM

## 2021-09-23 DIAGNOSIS — E66.9 OBESITY (BMI 30-39.9): ICD-10-CM

## 2021-09-23 DIAGNOSIS — A92.31 WEST NILE VIRUS ENCEPHALITIS: Primary | ICD-10-CM

## 2021-09-23 LAB
WEST NILE VIRUS AB, IGG, SER: 0.56 IV
WEST NILE VIRUS AB, IGM, SER: 4.87 IV

## 2021-09-23 PROCEDURE — 3074F SYST BP LT 130 MM HG: CPT | Performed by: FAMILY MEDICINE

## 2021-09-23 PROCEDURE — 99495 TRANSJ CARE MGMT MOD F2F 14D: CPT | Performed by: FAMILY MEDICINE

## 2021-09-23 PROCEDURE — 3079F DIAST BP 80-89 MM HG: CPT | Performed by: FAMILY MEDICINE

## 2021-09-23 PROCEDURE — 3008F BODY MASS INDEX DOCD: CPT | Performed by: FAMILY MEDICINE

## 2021-09-23 NOTE — PROGRESS NOTES
HPI:    Tiffani Loya is a 45year old female here today for hospital follow up.    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

## 2021-09-27 ENCOUNTER — TELEPHONE (OUTPATIENT)
Dept: INTERNAL MEDICINE CLINIC | Facility: CLINIC | Age: 51
End: 2021-09-27

## 2021-09-27 NOTE — TELEPHONE ENCOUNTER
Pt seen 9/23/21 for HFU, West Nile Virus    Pt called requesting return to work note to return 10/6 to be sent through [employer]     Pt also provided condition update, headaches and appetite has improved.  Pt stated she feels 50% better, still has low energy

## 2021-09-28 NOTE — PROGRESS NOTES
Several attempts made to reach the patient with no return call. Patient completed HFU on 9/23/2021. Closing encounter.

## 2021-10-20 DIAGNOSIS — I10 ESSENTIAL HYPERTENSION: ICD-10-CM

## 2021-10-20 RX ORDER — TRIAMTERENE AND HYDROCHLOROTHIAZIDE 37.5; 25 MG/1; MG/1
1 CAPSULE ORAL EVERY MORNING
Qty: 90 CAPSULE | Refills: 0 | Status: SHIPPED | OUTPATIENT
Start: 2021-10-20

## 2021-10-20 NOTE — TELEPHONE ENCOUNTER
Triamterene-hydrochlorothiazide 37.5-25 mg  Filled 7-20-21  Qty 90  0 refills  No upcoming appt.    LOV 7-20-21

## 2021-11-29 ENCOUNTER — TELEMEDICINE (OUTPATIENT)
Dept: INTERNAL MEDICINE CLINIC | Facility: CLINIC | Age: 51
End: 2021-11-29

## 2021-11-29 ENCOUNTER — LAB ENCOUNTER (OUTPATIENT)
Dept: LAB | Age: 51
End: 2021-11-29
Attending: FAMILY MEDICINE
Payer: COMMERCIAL

## 2021-11-29 DIAGNOSIS — J06.9 UPPER RESPIRATORY TRACT INFECTION, UNSPECIFIED TYPE: ICD-10-CM

## 2021-11-29 DIAGNOSIS — J01.90 ACUTE NON-RECURRENT SINUSITIS, UNSPECIFIED LOCATION: Primary | ICD-10-CM

## 2021-11-29 PROCEDURE — 99213 OFFICE O/P EST LOW 20 MIN: CPT | Performed by: FAMILY MEDICINE

## 2021-11-29 RX ORDER — AZITHROMYCIN 250 MG/1
TABLET, FILM COATED ORAL
Qty: 6 TABLET | Refills: 0 | Status: SHIPPED | OUTPATIENT
Start: 2021-11-29 | End: 2021-12-04

## 2021-11-29 NOTE — PROGRESS NOTES
Celia De La Rosa is a 45year old female.   HPI:   Patient has agreed to do a video encounter w me today in lieu of a regular appointment in regards to her URI for the last week   Had ST  Runny nose   HAs  Slight cough   Has PND    Has R ear pain now and c

## 2021-12-01 ENCOUNTER — TELEPHONE (OUTPATIENT)
Dept: INTERNAL MEDICINE CLINIC | Facility: CLINIC | Age: 51
End: 2021-12-01

## 2021-12-01 NOTE — TELEPHONE ENCOUNTER
Patient calling regarding her covid results, they were released to her via [employer], patient has concerns and would like recommendations.   Ph. [phone]

## 2022-04-05 RX ORDER — TRIAMTERENE AND HYDROCHLOROTHIAZIDE 37.5; 25 MG/1; MG/1
1 CAPSULE ORAL EVERY MORNING
Qty: 12 CAPSULE | Refills: 0 | Status: SHIPPED | OUTPATIENT
Start: 2022-04-05

## 2022-04-05 NOTE — TELEPHONE ENCOUNTER
Scheduled appt. For pt. Needs short refill    Triamterene-hydrochlorothiazide 37.5- 25 mg  Hypertension Medications Protocol Failed 04/05/2022 11:44 AM   Protocol Details  Appointment in past 6 or next 3 months   Filled 10-20-21  Qty 90  0 refills  No upcoming appt.   LOV 7-20-21 20

## 2022-04-18 ENCOUNTER — OFFICE VISIT (OUTPATIENT)
Dept: INTERNAL MEDICINE CLINIC | Facility: CLINIC | Age: 52
End: 2022-04-18
Payer: COMMERCIAL

## 2022-04-18 VITALS
TEMPERATURE: 98 F | HEART RATE: 69 BPM | BODY MASS INDEX: 37.14 KG/M2 | DIASTOLIC BLOOD PRESSURE: 80 MMHG | HEIGHT: 62 IN | OXYGEN SATURATION: 99 % | RESPIRATION RATE: 18 BRPM | WEIGHT: 201.81 LBS | SYSTOLIC BLOOD PRESSURE: 128 MMHG

## 2022-04-18 DIAGNOSIS — I10 ESSENTIAL HYPERTENSION: ICD-10-CM

## 2022-04-18 DIAGNOSIS — I10 PRIMARY HYPERTENSION: ICD-10-CM

## 2022-04-18 DIAGNOSIS — Z12.31 VISIT FOR SCREENING MAMMOGRAM: ICD-10-CM

## 2022-04-18 DIAGNOSIS — Z00.00 WELLNESS EXAMINATION: Primary | ICD-10-CM

## 2022-04-18 DIAGNOSIS — Z00.00 LABORATORY EXAM ORDERED AS PART OF ROUTINE GENERAL MEDICAL EXAMINATION: ICD-10-CM

## 2022-04-18 DIAGNOSIS — F43.9 STRESS: ICD-10-CM

## 2022-04-18 PROBLEM — J03.90 TONSILLITIS WITH EXUDATE: Status: RESOLVED | Noted: 2020-02-04 | Resolved: 2022-04-18

## 2022-04-18 PROCEDURE — 99396 PREV VISIT EST AGE 40-64: CPT | Performed by: FAMILY MEDICINE

## 2022-04-18 PROCEDURE — 3079F DIAST BP 80-89 MM HG: CPT | Performed by: FAMILY MEDICINE

## 2022-04-18 PROCEDURE — 3074F SYST BP LT 130 MM HG: CPT | Performed by: FAMILY MEDICINE

## 2022-04-18 PROCEDURE — 3008F BODY MASS INDEX DOCD: CPT | Performed by: FAMILY MEDICINE

## 2022-04-18 RX ORDER — TRIAMTERENE AND HYDROCHLOROTHIAZIDE 37.5; 25 MG/1; MG/1
1 CAPSULE ORAL EVERY MORNING
Qty: 90 CAPSULE | Refills: 1 | Status: SHIPPED | OUTPATIENT
Start: 2022-04-18

## 2022-04-18 RX ORDER — ALPRAZOLAM 0.5 MG/1
0.5 TABLET ORAL 2 TIMES DAILY PRN
Qty: 30 TABLET | Refills: 0 | Status: SHIPPED
Start: 2022-04-18

## 2022-04-18 RX ORDER — TRIAMTERENE AND HYDROCHLOROTHIAZIDE 37.5; 25 MG/1; MG/1
1 CAPSULE ORAL EVERY MORNING
Qty: 12 CAPSULE | Refills: 0 | Status: CANCELLED | OUTPATIENT
Start: 2022-04-18

## 2022-04-18 RX ORDER — ACETAMINOPHEN AND CODEINE PHOSPHATE 300; 30 MG/1; MG/1
1-2 TABLET ORAL
COMMUNITY
Start: 2022-02-08

## 2022-07-04 RX ORDER — ALPRAZOLAM 0.5 MG/1
TABLET ORAL
Qty: 30 TABLET | Refills: 0 | Status: SHIPPED | OUTPATIENT
Start: 2022-07-04

## 2022-09-12 PROCEDURE — 87086 URINE CULTURE/COLONY COUNT: CPT | Performed by: OBSTETRICS & GYNECOLOGY

## 2022-10-03 ENCOUNTER — HOSPITAL ENCOUNTER (OUTPATIENT)
Dept: MAMMOGRAPHY | Age: 52
Discharge: HOME | End: 2022-10-03
Attending: FAMILY MEDICINE
Payer: COMMERCIAL

## 2022-10-03 DIAGNOSIS — Z12.31 VISIT FOR SCREENING MAMMOGRAM: ICD-10-CM

## 2022-10-03 PROCEDURE — 77063 BREAST TOMOSYNTHESIS BI: CPT | Performed by: FAMILY MEDICINE

## 2022-10-03 PROCEDURE — 77067 SCR MAMMO BI INCL CAD: CPT | Performed by: FAMILY MEDICINE

## 2022-10-13 DIAGNOSIS — I10 ESSENTIAL HYPERTENSION: ICD-10-CM

## 2022-10-19 RX ORDER — TRIAMTERENE AND HYDROCHLOROTHIAZIDE 37.5; 25 MG/1; MG/1
1 CAPSULE ORAL EVERY MORNING
Qty: 30 CAPSULE | Refills: 0 | Status: SHIPPED | OUTPATIENT
Start: 2022-10-19 | End: 2022-12-15

## 2023-03-03 DIAGNOSIS — I10 ESSENTIAL HYPERTENSION: ICD-10-CM

## 2023-03-03 RX ORDER — TRIAMTERENE AND HYDROCHLOROTHIAZIDE 37.5; 25 MG/1; MG/1
1 CAPSULE ORAL EVERY MORNING
Qty: 30 CAPSULE | Refills: 0 | Status: SHIPPED | OUTPATIENT
Start: 2023-03-03

## 2023-03-03 NOTE — TELEPHONE ENCOUNTER
Triamterene-hydrochlorothiazide 37.5-25 mg   Hypertension Medications Protocol Failed 03/03/2023 07:20 AM   Protocol Details  CMP or BMP in past 12 months    Appointment in past 6 or next 3 months      Filled 12-15-22  Qty 30  0 refills  No upcoming appt.   LOV 4-18-22 TO

## 2023-04-06 DIAGNOSIS — I10 ESSENTIAL HYPERTENSION: ICD-10-CM

## 2023-04-06 RX ORDER — TRIAMTERENE AND HYDROCHLOROTHIAZIDE 37.5; 25 MG/1; MG/1
CAPSULE ORAL
Qty: 30 CAPSULE | Refills: 0 | Status: SHIPPED | OUTPATIENT
Start: 2023-04-06

## 2023-04-17 ENCOUNTER — OFFICE VISIT (OUTPATIENT)
Dept: INTERNAL MEDICINE CLINIC | Facility: CLINIC | Age: 53
End: 2023-04-17
Payer: COMMERCIAL

## 2023-04-17 VITALS
SYSTOLIC BLOOD PRESSURE: 128 MMHG | BODY MASS INDEX: 36.57 KG/M2 | WEIGHT: 203.81 LBS | OXYGEN SATURATION: 99 % | TEMPERATURE: 98 F | DIASTOLIC BLOOD PRESSURE: 82 MMHG | HEART RATE: 81 BPM | HEIGHT: 62.5 IN

## 2023-04-17 DIAGNOSIS — I10 PRIMARY HYPERTENSION: Primary | ICD-10-CM

## 2023-04-17 DIAGNOSIS — Z00.00 LABORATORY EXAM ORDERED AS PART OF ROUTINE GENERAL MEDICAL EXAMINATION: ICD-10-CM

## 2023-04-17 DIAGNOSIS — I10 ESSENTIAL HYPERTENSION: ICD-10-CM

## 2023-04-17 RX ORDER — TRIAMTERENE AND HYDROCHLOROTHIAZIDE 37.5; 25 MG/1; MG/1
CAPSULE ORAL
Qty: 90 CAPSULE | Refills: 1 | Status: SHIPPED | OUTPATIENT
Start: 2023-04-17

## 2023-04-18 ENCOUNTER — LAB ENCOUNTER (OUTPATIENT)
Dept: LAB | Age: 53
End: 2023-04-18
Attending: FAMILY MEDICINE
Payer: COMMERCIAL

## 2023-04-18 DIAGNOSIS — Z00.00 LABORATORY EXAM ORDERED AS PART OF ROUTINE GENERAL MEDICAL EXAMINATION: ICD-10-CM

## 2023-04-18 LAB
ALBUMIN SERPL-MCNC: 3.2 G/DL (ref 3.4–5)
ALBUMIN/GLOB SERPL: 0.6 {RATIO} (ref 1–2)
ALP LIVER SERPL-CCNC: 55 U/L
ALT SERPL-CCNC: 33 U/L
ANION GAP SERPL CALC-SCNC: 3 MMOL/L (ref 0–18)
AST SERPL-CCNC: 21 U/L (ref 15–37)
BASOPHILS # BLD AUTO: 0.03 X10(3) UL (ref 0–0.2)
BASOPHILS NFR BLD AUTO: 0.6 %
BILIRUB SERPL-MCNC: 0.2 MG/DL (ref 0.1–2)
BILIRUB UR QL STRIP.AUTO: NEGATIVE
BUN BLD-MCNC: 15 MG/DL (ref 7–18)
CALCIUM BLD-MCNC: 10.8 MG/DL (ref 8.5–10.1)
CHLORIDE SERPL-SCNC: 104 MMOL/L (ref 98–112)
CHOLEST SERPL-MCNC: 177 MG/DL (ref ?–200)
CO2 SERPL-SCNC: 30 MMOL/L (ref 21–32)
COLOR UR AUTO: YELLOW
CREAT BLD-MCNC: 0.83 MG/DL
EOSINOPHIL # BLD AUTO: 0.09 X10(3) UL (ref 0–0.7)
EOSINOPHIL NFR BLD AUTO: 1.8 %
ERYTHROCYTE [DISTWIDTH] IN BLOOD BY AUTOMATED COUNT: 14.6 %
EST. AVERAGE GLUCOSE BLD GHB EST-MCNC: 128 MG/DL (ref 68–126)
FASTING PATIENT LIPID ANSWER: YES
FASTING STATUS PATIENT QL REPORTED: YES
GFR SERPLBLD BASED ON 1.73 SQ M-ARVRAT: 84 ML/MIN/1.73M2 (ref 60–?)
GLOBULIN PLAS-MCNC: 5.6 G/DL (ref 2.8–4.4)
GLUCOSE BLD-MCNC: 107 MG/DL (ref 70–99)
GLUCOSE UR STRIP.AUTO-MCNC: NEGATIVE MG/DL
HBA1C MFR BLD: 6.1 % (ref ?–5.7)
HCT VFR BLD AUTO: 33.6 %
HDLC SERPL-MCNC: 52 MG/DL (ref 40–59)
HGB BLD-MCNC: 11.3 G/DL
IMM GRANULOCYTES # BLD AUTO: 0 X10(3) UL (ref 0–1)
IMM GRANULOCYTES NFR BLD: 0 %
KETONES UR STRIP.AUTO-MCNC: NEGATIVE MG/DL
LDLC SERPL CALC-MCNC: 98 MG/DL (ref ?–100)
LEUKOCYTE ESTERASE UR QL STRIP.AUTO: NEGATIVE
LYMPHOCYTES # BLD AUTO: 2.42 X10(3) UL (ref 1–4)
LYMPHOCYTES NFR BLD AUTO: 47.3 %
MCH RBC QN AUTO: 32.8 PG (ref 26–34)
MCHC RBC AUTO-ENTMCNC: 33.6 G/DL (ref 31–37)
MCV RBC AUTO: 97.7 FL
MONOCYTES # BLD AUTO: 0.43 X10(3) UL (ref 0.1–1)
MONOCYTES NFR BLD AUTO: 8.4 %
NEUTROPHILS # BLD AUTO: 2.15 X10 (3) UL (ref 1.5–7.7)
NEUTROPHILS # BLD AUTO: 2.15 X10(3) UL (ref 1.5–7.7)
NEUTROPHILS NFR BLD AUTO: 41.9 %
NITRITE UR QL STRIP.AUTO: NEGATIVE
NONHDLC SERPL-MCNC: 125 MG/DL (ref ?–130)
OSMOLALITY SERPL CALC.SUM OF ELEC: 285 MOSM/KG (ref 275–295)
PH UR STRIP.AUTO: 7 [PH] (ref 5–8)
PLATELET # BLD AUTO: 238 10(3)UL (ref 150–450)
POTASSIUM SERPL-SCNC: 3.6 MMOL/L (ref 3.5–5.1)
PROT SERPL-MCNC: 8.8 G/DL (ref 6.4–8.2)
PROT UR STRIP.AUTO-MCNC: 30 MG/DL
RBC # BLD AUTO: 3.44 X10(6)UL
SODIUM SERPL-SCNC: 137 MMOL/L (ref 136–145)
SP GR UR STRIP.AUTO: 1.02 (ref 1–1.03)
TRIGL SERPL-MCNC: 158 MG/DL (ref 30–149)
TSI SER-ACNC: 0.8 MIU/ML (ref 0.36–3.74)
UROBILINOGEN UR STRIP.AUTO-MCNC: <2 MG/DL
VLDLC SERPL CALC-MCNC: 26 MG/DL (ref 0–30)
WBC # BLD AUTO: 5.1 X10(3) UL (ref 4–11)

## 2023-04-18 PROCEDURE — 80053 COMPREHEN METABOLIC PANEL: CPT

## 2023-04-18 PROCEDURE — 81001 URINALYSIS AUTO W/SCOPE: CPT | Performed by: FAMILY MEDICINE

## 2023-04-18 PROCEDURE — 85025 COMPLETE CBC W/AUTO DIFF WBC: CPT

## 2023-04-18 PROCEDURE — 80061 LIPID PANEL: CPT

## 2023-04-18 PROCEDURE — 84443 ASSAY THYROID STIM HORMONE: CPT

## 2023-04-18 PROCEDURE — 83036 HEMOGLOBIN GLYCOSYLATED A1C: CPT | Performed by: FAMILY MEDICINE

## 2023-04-18 PROCEDURE — 36415 COLL VENOUS BLD VENIPUNCTURE: CPT

## 2023-04-19 DIAGNOSIS — R73.09 ELEVATED GLUCOSE: ICD-10-CM

## 2023-04-19 DIAGNOSIS — D64.9 ANEMIA, UNSPECIFIED TYPE: ICD-10-CM

## 2023-04-19 DIAGNOSIS — R77.9 ELEVATED BLOOD PROTEIN: Primary | ICD-10-CM

## 2023-06-05 ENCOUNTER — HOSPITAL ENCOUNTER (OUTPATIENT)
Age: 53
Discharge: HOME | End: 2023-06-05
Payer: COMMERCIAL

## 2023-06-05 VITALS
TEMPERATURE: 98 F | SYSTOLIC BLOOD PRESSURE: 130 MMHG | RESPIRATION RATE: 20 BRPM | HEART RATE: 80 BPM | DIASTOLIC BLOOD PRESSURE: 83 MMHG | OXYGEN SATURATION: 98 %

## 2023-06-05 DIAGNOSIS — J01.91 ACUTE RECURRENT SINUSITIS, UNSPECIFIED LOCATION: ICD-10-CM

## 2023-06-05 DIAGNOSIS — H66.90 ACUTE OTITIS MEDIA, UNSPECIFIED OTITIS MEDIA TYPE: Primary | ICD-10-CM

## 2023-06-05 PROCEDURE — 99213 OFFICE O/P EST LOW 20 MIN: CPT

## 2023-06-05 PROCEDURE — 99214 OFFICE O/P EST MOD 30 MIN: CPT

## 2023-06-05 RX ORDER — AMOXICILLIN AND CLAVULANATE POTASSIUM 875; 125 MG/1; MG/1
1 TABLET, FILM COATED ORAL 2 TIMES DAILY
Qty: 20 TABLET | Refills: 0 | Status: SHIPPED | OUTPATIENT
Start: 2023-06-05 | End: 2023-06-15

## 2023-06-05 NOTE — DISCHARGE INSTRUCTIONS
Take antibiotics as directed and to completion  Avoid having water run into or sit in your ear  Do not submerge your ear in water  Do not put anything in your ear farther than you can see     Wash hands often  Disinfect your environment, linens, electronics, etc.  Drink plenty of fluids (water, Pedialyte, etc.)  Get plenty of rest  Do not share utensils or drinks  Salt water gargles throughout the day for sore throat  Alternate Ibuprofen (adult: 600mg) and Tylenol (adult: 650-1000mg) as needed for pain / body aches / fever  You may benefit from taking a decongestant (e.g. Sudafed - pseudoephedrine [behind the pharmacy counter])  You may benefit from using a humidifier and/or steam showers  You may benefit from Flonase nasal spray daily  Avoid having air blow on your face as this can worsen congestion  Symptoms may take a few weeks to resolve

## 2023-10-28 DIAGNOSIS — I10 ESSENTIAL HYPERTENSION: ICD-10-CM

## 2023-10-30 NOTE — TELEPHONE ENCOUNTER
[facility] sent to patient to schedule appointment    Requesting   Name from pharmacy: Triamterene/Hydrochlorothiazide 37.5-25 Mg Cap Sand          Will file in chart as: TRIAMTERENE-HYDROCHLOROTHIAZIDE 37.5-25 MG Oral Cap    Sig: TAKE 1 CAPSULE BY MOUTH EVERY MORNING. DUE FOR AN APPOINTMENT IN THE APRIL    Disp: 90 capsule    Refills: 0    Start: 10/28/2023    Class: Normal    Non-formulary For: Essential hypertension    Last ordered: 6 months ago (4/17/2023) by Jorge Coates MD    Last refill: 9/30/2023    Rx #: [id number]    Hypertension Medications Protocol Yiyxja40/28/2023 01:30 AM   Protocol Details Appointment in past 6 or next 3 months    CMP or BMP in past 12 months    Last serum creatinine< 2.0        LOV: 4/17/2023  RTC: 6 months   Last Relevant Labs: 4/18/2023  Filled: 4/17/2023 #90 with 1 refills    No future appointments.

## 2023-10-31 NOTE — TELEPHONE ENCOUNTER
Called pt and left her a voicemail to call us back.    LUDWIG   Left detailed message for pt to return call, is due for CPX  (with Pap) and BP f/u appt.

## 2023-11-02 RX ORDER — TRIAMTERENE AND HYDROCHLOROTHIAZIDE 37.5; 25 MG/1; MG/1
CAPSULE ORAL
Qty: 30 CAPSULE | Refills: 0 | Status: SHIPPED | OUTPATIENT
Start: 2023-11-02

## 2023-11-07 ENCOUNTER — LAB ENCOUNTER (OUTPATIENT)
Dept: LAB | Age: 53
End: 2023-11-07
Attending: FAMILY MEDICINE
Payer: COMMERCIAL

## 2023-11-07 DIAGNOSIS — D64.9 ANEMIA, UNSPECIFIED TYPE: ICD-10-CM

## 2023-11-07 DIAGNOSIS — R73.09 ELEVATED GLUCOSE: ICD-10-CM

## 2023-11-07 DIAGNOSIS — R77.9 ELEVATED BLOOD PROTEIN: ICD-10-CM

## 2023-11-07 LAB
ALBUMIN SERPL-MCNC: 3.1 G/DL (ref 3.4–5)
ALBUMIN/GLOB SERPL: 0.5 {RATIO} (ref 1–2)
ALP LIVER SERPL-CCNC: 62 U/L
ALT SERPL-CCNC: 34 U/L
ANION GAP SERPL CALC-SCNC: 8 MMOL/L (ref 0–18)
AST SERPL-CCNC: 18 U/L (ref 15–37)
BASOPHILS # BLD AUTO: 0.03 X10(3) UL (ref 0–0.2)
BASOPHILS NFR BLD AUTO: 0.5 %
BILIRUB SERPL-MCNC: 0.2 MG/DL (ref 0.1–2)
BUN BLD-MCNC: 21 MG/DL (ref 9–23)
CALCIUM BLD-MCNC: 10.9 MG/DL (ref 8.5–10.1)
CHLORIDE SERPL-SCNC: 106 MMOL/L (ref 98–112)
CO2 SERPL-SCNC: 28 MMOL/L (ref 21–32)
CREAT BLD-MCNC: 0.84 MG/DL
EGFRCR SERPLBLD CKD-EPI 2021: 83 ML/MIN/1.73M2 (ref 60–?)
EOSINOPHIL # BLD AUTO: 0.11 X10(3) UL (ref 0–0.7)
EOSINOPHIL NFR BLD AUTO: 1.9 %
ERYTHROCYTE [DISTWIDTH] IN BLOOD BY AUTOMATED COUNT: 14.5 %
EST. AVERAGE GLUCOSE BLD GHB EST-MCNC: 134 MG/DL (ref 68–126)
FASTING STATUS PATIENT QL REPORTED: YES
GLOBULIN PLAS-MCNC: 5.7 G/DL (ref 2.8–4.4)
GLUCOSE BLD-MCNC: 104 MG/DL (ref 70–99)
HBA1C MFR BLD: 6.3 % (ref ?–5.7)
HCT VFR BLD AUTO: 34.1 %
HGB BLD-MCNC: 11.1 G/DL
IMM GRANULOCYTES # BLD AUTO: 0.01 X10(3) UL (ref 0–1)
IMM GRANULOCYTES NFR BLD: 0.2 %
LYMPHOCYTES # BLD AUTO: 2.6 X10(3) UL (ref 1–4)
LYMPHOCYTES NFR BLD AUTO: 45.2 %
MCH RBC QN AUTO: 32.6 PG (ref 26–34)
MCHC RBC AUTO-ENTMCNC: 32.6 G/DL (ref 31–37)
MCV RBC AUTO: 100.3 FL
MONOCYTES # BLD AUTO: 0.54 X10(3) UL (ref 0.1–1)
MONOCYTES NFR BLD AUTO: 9.4 %
NEUTROPHILS # BLD AUTO: 2.46 X10 (3) UL (ref 1.5–7.7)
NEUTROPHILS # BLD AUTO: 2.46 X10(3) UL (ref 1.5–7.7)
NEUTROPHILS NFR BLD AUTO: 42.8 %
OSMOLALITY SERPL CALC.SUM OF ELEC: 297 MOSM/KG (ref 275–295)
PLATELET # BLD AUTO: 230 10(3)UL (ref 150–450)
POTASSIUM SERPL-SCNC: 3.5 MMOL/L (ref 3.5–5.1)
PROT SERPL-MCNC: 8.8 G/DL (ref 6.4–8.2)
RBC # BLD AUTO: 3.4 X10(6)UL
SODIUM SERPL-SCNC: 142 MMOL/L (ref 136–145)
WBC # BLD AUTO: 5.8 X10(3) UL (ref 4–11)

## 2023-11-07 PROCEDURE — 80053 COMPREHEN METABOLIC PANEL: CPT

## 2023-11-07 PROCEDURE — 83036 HEMOGLOBIN GLYCOSYLATED A1C: CPT

## 2023-11-07 PROCEDURE — 36415 COLL VENOUS BLD VENIPUNCTURE: CPT

## 2023-11-07 PROCEDURE — 85025 COMPLETE CBC W/AUTO DIFF WBC: CPT

## 2023-11-08 DIAGNOSIS — R77.9 ELEVATED BLOOD PROTEIN: Primary | ICD-10-CM

## 2023-11-10 RX ORDER — FLUTICASONE PROPIONATE 50 MCG
SPRAY, SUSPENSION (ML) NASAL
COMMUNITY
Start: 2023-06-29

## 2023-11-13 ENCOUNTER — OFFICE VISIT (OUTPATIENT)
Dept: INTERNAL MEDICINE CLINIC | Facility: CLINIC | Age: 53
End: 2023-11-13
Payer: COMMERCIAL

## 2023-11-13 ENCOUNTER — LAB ENCOUNTER (OUTPATIENT)
Dept: LAB | Age: 53
End: 2023-11-13
Attending: FAMILY MEDICINE
Payer: COMMERCIAL

## 2023-11-13 VITALS
BODY MASS INDEX: 38.28 KG/M2 | HEART RATE: 81 BPM | SYSTOLIC BLOOD PRESSURE: 120 MMHG | WEIGHT: 208 LBS | TEMPERATURE: 98 F | OXYGEN SATURATION: 98 % | DIASTOLIC BLOOD PRESSURE: 68 MMHG | HEIGHT: 62 IN

## 2023-11-13 DIAGNOSIS — Z12.31 VISIT FOR SCREENING MAMMOGRAM: ICD-10-CM

## 2023-11-13 DIAGNOSIS — E83.52 SERUM CALCIUM ELEVATED: ICD-10-CM

## 2023-11-13 DIAGNOSIS — I10 ESSENTIAL HYPERTENSION: ICD-10-CM

## 2023-11-13 DIAGNOSIS — R77.9 ELEVATED SERUM PROTEIN LEVEL: ICD-10-CM

## 2023-11-13 DIAGNOSIS — D64.9 ANEMIA, UNSPECIFIED TYPE: ICD-10-CM

## 2023-11-13 DIAGNOSIS — Z00.00 WELLNESS EXAMINATION: Primary | ICD-10-CM

## 2023-11-13 PROBLEM — R73.01 IMPAIRED FASTING GLUCOSE: Status: ACTIVE | Noted: 2023-11-13

## 2023-11-13 LAB
BILIRUB UR QL STRIP.AUTO: NEGATIVE
CLARITY UR REFRACT.AUTO: CLEAR
COLOR UR AUTO: COLORLESS
GLUCOSE UR STRIP.AUTO-MCNC: NORMAL MG/DL
IRON SATN MFR SERPL: 27 %
IRON SERPL-MCNC: 88 UG/DL
KETONES UR STRIP.AUTO-MCNC: NEGATIVE MG/DL
LEUKOCYTE ESTERASE UR QL STRIP.AUTO: NEGATIVE
NITRITE UR QL STRIP.AUTO: NEGATIVE
PH UR STRIP.AUTO: 7 [PH] (ref 5–8)
PROT UR STRIP.AUTO-MCNC: NEGATIVE MG/DL
PTH-INTACT SERPL-MCNC: 33 PG/ML (ref 18.5–88)
RBC UR QL AUTO: NEGATIVE
SP GR UR STRIP.AUTO: 1 (ref 1–1.03)
TIBC SERPL-MCNC: 325 UG/DL (ref 240–450)
TRANSFERRIN SERPL-MCNC: 218 MG/DL (ref 200–360)
UROBILINOGEN UR STRIP.AUTO-MCNC: NORMAL MG/DL

## 2023-11-13 PROCEDURE — 36415 COLL VENOUS BLD VENIPUNCTURE: CPT

## 2023-11-13 PROCEDURE — 83970 ASSAY OF PARATHORMONE: CPT

## 2023-11-13 PROCEDURE — 81003 URINALYSIS AUTO W/O SCOPE: CPT | Performed by: FAMILY MEDICINE

## 2023-11-13 PROCEDURE — 83550 IRON BINDING TEST: CPT

## 2023-11-13 PROCEDURE — 83540 ASSAY OF IRON: CPT

## 2023-11-13 RX ORDER — TRIAMTERENE AND HYDROCHLOROTHIAZIDE 37.5; 25 MG/1; MG/1
CAPSULE ORAL
Qty: 30 CAPSULE | Refills: 0 | Status: SHIPPED | OUTPATIENT
Start: 2023-11-13

## 2023-11-14 ENCOUNTER — TELEPHONE (OUTPATIENT)
Dept: INTERNAL MEDICINE CLINIC | Facility: CLINIC | Age: 53
End: 2023-11-14

## 2023-11-14 DIAGNOSIS — D64.9 LOW HEMOGLOBIN: ICD-10-CM

## 2023-11-14 DIAGNOSIS — R77.9 ELEVATED BLOOD PROTEIN: Primary | ICD-10-CM

## 2023-11-14 NOTE — TELEPHONE ENCOUNTER
Repeat orders entered. SPEP lab not complete yesterday, patient needs to go back to lab to have it drawn.

## 2023-11-14 NOTE — TELEPHONE ENCOUNTER
Spoke with patient via phone. Informed on need to return to lab to have Monoclonal Protein study completed. Patient verbalized understanding and states she will complete this weekend. Denies further questions.

## 2023-11-14 NOTE — TELEPHONE ENCOUNTER
----- Message from Jason Amaya MD sent at 11/14/2023  1:58 PM CST -----  The PTH is normal as is the iron levels      rec stevie CBC/cmp 3 month nonfasting

## 2024-01-29 DIAGNOSIS — I10 ESSENTIAL HYPERTENSION: ICD-10-CM

## 2024-01-30 RX ORDER — TRIAMTERENE AND HYDROCHLOROTHIAZIDE 37.5; 25 MG/1; MG/1
CAPSULE ORAL
Qty: 30 CAPSULE | Refills: 2 | Status: SHIPPED | OUTPATIENT
Start: 2024-01-30

## 2025-06-01 NOTE — TELEPHONE ENCOUNTER
LOV: 8/31/2020 with Dr. Andra Wong  RTC: \" RTC for px\"  Last Relevant Labs: 11/20/2018   Filled: 9/11/2020  #30 with 0 refills    Future Appointments   Date Time Provider Anisa Tangisti   11/3/2020 11:15 AM [facility]   11/9/2020 11:00 AM Anu Mendiola MD [facility] OFFICE VISIT    Chief Complaint   Patient presents with    Gyn Exam             HISTORY OF PRESENT ILLNESS:  Patient is a 47 year old  who presents today for annual exam    Monthly light bleeding with IUD. Notices old blood with wiping  - Noticed bleeding symptoms x1 year    PCP: Jeffrey Rodriguez DO   - Discussed palpitations with PCP--> Denies further evaluation/concern    25: ER visit--> Dx anxiety attack  - Normal EKG    3/14/25: Normal EKG    Denies Fever/Chills/Nausea/Vomiting/Chest Pain/Shortness of Breath/Urinary symptoms     Denies unintentional weight loss, changes in appetite, early satiety, changes in bowel movements, new bloating symptoms     HISTORY  10/4/24: Follow-up appointment with Dr Behrens    24: Appointment with Breast clinic secondary to persistent breast skin changes    24: S/P annual exam with Dr. Behrens    24: S/P Dermatology consultation    Persistent breast pain  - Taking evening primrose and is uncertain regarding whether or not this is helping  - Pain since 2024  - Pain is intermittent  - Denies history of similar pain  - Both breasts (R>L) and radiates into bilateral axilla  - Denies masses or nipple discharge    Perimenopause  - Sx: Headache, nausea, \"crawling skin\"  - Uncertain if hot flashes or just temperature changes  - Took OTC menopause test that was positive  - Anxiety, Occasional palpitations--> Resolves spontaneously. Using Calm meghan, breathing exercises, etc.     Mirena IUD in place   - Barely had bleeding prior to 2024  - Uses pantyliner only  - Dark blood now more often than before  - Increasing pelvic cramping    Denies Fever/Chills/Nausea/Vomiting/Chest Pain/Shortness of Breath/Changes in vision/Urinary symptoms    Denies unintentional weight loss, changes in appetite, early satiety, changes in bowel movements, new bloating symptoms    Patient's last menstrual period was 2025 (approximate).    ALLERGIES:   Allergen Reactions     Pineapple   (Food Or Med) ANAPHYLAXIS and PRURITUS       Outpatient Medications Marked as Taking for the 6/2/25 encounter (Office Visit) with Sandrita Ziegler MD   Medication Sig Dispense Refill    famotidine (PEPCID) 20 MG tablet TAKE 1 TABLET BY MOUTH EVERY NIGHT 90 tablet 3    pantoprazole (PROTONIX) 40 MG tablet Take 1 tablet by mouth daily. 90 tablet 1    Multiple Vitamins-Minerals (MULTIVITAMIN WOMEN PO)       Levonorgestrel (MIRENA, 52 MG, IU)          Past Medical History:   Diagnosis Date    Anxiety 04/01/2023    Gastroesophageal reflux disease without esophagitis 11/30/2021    NO HX OF     HTN, DM, DVT/PE, Bleeding disorder, Blood transfusion, CVA, MI, Cancer    Other chest pain 11/30/2021 11/30/21 pt is following up on an ED visit where she went in for chest pain. She states that the fist time it happened was about one week ago last Monday. States that pain is in mid chest. Pain is non radiating, none exertional, denies associated symptoms like SOB, neck pian, arm pain or pain in her back. States that pain is better when she lies down and sleeps. States that she has still been havi    Pre-diabetes     Superficial thrombophlebitis     S/P varicose vein procedure    Varicosities     \"varicose veins\"       Past Surgical History:   Procedure Laterality Date    Colonoscopy diagnostic  05/30/2025    5 year recall    Colonoscopy w/ polypectomy  05/13/2022    Dr. Owen Toney, Recall in 3 years, polyp removed, diverticulosis, internal hemorrhoids,    Colposcopy,loop electrd cervix excis      Laparoscopy, cholecystectomy  2007       Social History     Socioeconomic History    Marital status: Single     Spouse name: Not on file    Number of children: Not on file    Years of education: Not on file    Highest education level: Not on file   Occupational History    Occupation:    Tobacco Use    Smoking status: Never     Passive exposure: Current    Smokeless tobacco: Never    Tobacco comments:      Second-hand smoke at job ([employer]) prior to 4 years ago (Exposure x15 years)   Vaping Use    Vaping status: never used   Substance and Sexual Activity    Alcohol use: Yes     Comment: Occasionally. Denies abuse history    Drug use: No     Comment: Denies abuse history.    Sexual activity: Not Currently     Partners: Male     Birth control/protection: I.U.D.     Comment: No current partner. Last intercourse 2020   Other Topics Concern    Not on file   Social History Narrative    Lives at home with 2 sons and her brother     Feels safe in environment    Denies abuse history     Social Drivers of Health     Financial Resource Strain: Low Risk  (7/24/2024)    Financial Resource Strain     Unable to Get: None   Food Insecurity: Low Risk  (7/24/2024)    Food Insecurity     Worried about Food: Never true     Food is Gone: Never true   Transportation Needs: Not At Risk (7/24/2024)    Transportation Needs     Lack of Reliable Transportation: No   Physical Activity: Medium Risk (7/24/2024)    Exercise Vital Sign     Days of Exercise per Week: 3 days     Minutes of Exercise per Session: 20 min   Stress: Low Risk  (7/24/2024)    Stress     How Stressed: A little bit   Social Connections: Unknown (8/20/2024)    Received from [facility]    Social Connections     Social Connections: Last Flowsheet Data: Not on file     Social Connections: Recent Result: Not on file   Feeling safe in your relationship: Low Risk  (4/16/2025)    Interpersonal Safety     How often physically hurt: Never     How often insulted or talked down to: Never     How often threatened with harm: Never     How often scream or curse at: Never       Family History   Problem Relation Age of Onset    BRCA Untested Mother     Diabetes Mother     Osteoarthritis Mother     Cancer, Breast Mother 76    Heart disease Father         Triple bypass    Kidney disease Father     * Sister         Endometrial polyp    Myocardial Infarction Brother 40     BRCA Untested Paternal Aunt     Cancer, Breast Paternal Aunt         unsure    Cancer, Breast Paternal Aunt     Other Neg Hx         DVT/PE, Bleeding disorder, Other cancer    Cancer, Ovarian Neg Hx     Cancer, Prostate Neg Hx        OB History    Para Term  AB Living   3 2 2  1 2   SAB IAB Ectopic Molar Multiple Live Births   0     2      # Outcome Date GA Lbr Keenan/2nd Weight Sex Type Anes PTL Lv   3 Term 12 41w0d 04:35 / 01:00 3.807 kg (8 lb 6.3 oz) M Vag-Spont IV ANALGESIC, EPI N KALA   2 Term    2.948 kg (6 lb 8 oz) M Vag-Spont None  KALA   1 AB  5w0d             Obstetric Comments   14 x30 x3   Mirena IUD inserted 2018   History of heavy menstrual bleeding/dysmenorrhea prior to Mirena   Hx abnormal pap--> Colposcopy/LEEP approximately 10 years ago   Normal Pap since    Denies STI        REVIEW OF SYSTEMS:    Constitutional:  Denies headache.  No weight changes.  No fevers or chills.    HEENT:  Denies vision changes or hearing changes. No sinus problems.  Breasts:  Denies breast masses, pain or nipple discharge.    Respiratory:  No breathing issues, cough or shortness of breath.  Cardiovascular:  Denies chest pain, syncope. + palpitations.    Gastrointestinal:  Denies nausea, vomiting, diarrhea, or constipation.  Endocrine:  Denies hot flashes, night sweats, heat or cold intolerance.  Hematologic:  Denies easy bruising or bleeding disorders.  Allergies/Immunologic:  Denies seasonal allergies or any history of immunologic disorders.  Neurologic:  Normal sensation and motor control.  No history of seizures or syncope.  Musculoskeletal:  Denies joint pain, swelling, or erythema.  Skin:  Denies rashes, significant lesions or pruritus.  Psychiatric:  Denies anxiety, depression, memory deficits, and appetite or sleep changes.    PHYSICAL EXAM:  Visit Vitals  Blood Pressure 120/82 (BP Location: RUE - Right upper extremity, Patient Position: Sitting, Cuff Size: Regular)   Pulse 84   Height 5'  7\" (1.702 m)   Weight 86.7 kg (191 lb 1.6 oz)   Last Menstrual Period 04/23/2025 (Approximate) Comment: pt on IUD mirena, spotting 2 days ago   Body Mass Index 29.93 kg/m²     GENERAL APPEARANCE:  The patient is a pleasant, normal appearing female with normal affect and in no distress.  NECK: Supple.  No cervical lymphadenopathy.  No thyromegaly, no nodules palpated, trachea midline.  LUNGS: Clear bilaterally with normal respiratory effort.  HEART:   Regular rate and rhythm.  No murmurs noted.  Pulses are full and symmetrical.  BREAST: Breast exam performed seated and supine. No palpable bilateral axillary lymphadenopathy. No erythema or peau d'orange changes of the skin. No palpable induration of the skin. Everted nipples without abnormal nipple discharge. No dominant masses or suspicious densities palpable throughout the breast.    ABDOMEN: Soft, nontender, non-distended.  No hepatosplenomegaly.  No umbilical or inguinal hernias.  SKIN:  Warm and dry to touch.  No lesions or rashes noted.    PSYCHIATRIC/NEUROLOGIC:  Appropriate mood and affect, normal recall, alert and oriented x 3.  EXTREMITIES:  Warm and well perfused.  No edema noted.  Muscle strength and sensation are normal bilaterally, 5/5 in both upper and lower extremities.   :  Vulva: Inspection of her external genitalia reveals normal mons pubis, labia minora and labia majora.  Normal appearing clitoris, urethral meatus and [place]'s glands.    Bladder:  No evidence of urethral or bladder tenderness.    Vagina:  Speculum exam reveals pink and moist vaginal mucosa.  Bartholin gland is normal to palpation.  Cervix:  Cervix is normal in appearance with no lesions.  There is no cervical motion tenderness. IUD strings easily visualized  Uterus:  Uterus is normal size, mobile and non-tender.  No adnexal masses are palpated.  Adnexae are non-tender to palpation.  Perineum:  Perineum appears normal.  Anus:  Normal with no apparent lesions.       ASSESSMENT:  Annual exam    Patient Active Problem List   Diagnosis    Varicella    Vitamin D deficiency    Dyslipidemia    Gastroesophageal reflux disease without esophagitis    Environmental allergies    Post-nasal drainage    Anxiety    History of hematuria    Tinea versicolor    Chronic left-sided low back pain    Perimenopause    Malar rash    Breast cyst, unspecified laterality    Costochondritis    Morphea - left lateral chest wall    Prediabetes       PLAN:  Breast pain  - Denies concerns today  - S/P Breast Health consultation  - Continue conservative management options     Perimenopausal symptoms  - S/P consultation  - Declined medications  - Hx referral to accupuncture    Routine Health Maintenance  - Self breast awareness reviewed.  - Safe sex practices reviewed with patient. STD testing not indicated  - HPV testing discussed and new Pap smear recommendations reviewed with patient. Pap due: TODAY (Hx LEEP/High grade cervical dysplasia)  - Fertility plan: Does not desire future fertility  - Contraception: Mirena IUD   - Mammogram due 1/2026  - Colonoscopy due: 5/2030. Hx tubular adenoma  - Calcium recommendations: 19-51 yo =1000 mg/day  - Vitamin D recommendations: 19-71 yo =600 international units/day     Disposition  - Stable  - RTC in 1 year or PRN    Patient expressed understanding of current management plan and agrees. She has no further questions at this time.

## (undated) DIAGNOSIS — I10 ESSENTIAL HYPERTENSION: ICD-10-CM

## (undated) DEVICE — SUTURE SILK 3-0

## (undated) DEVICE — DRAIN RELIAVAC 100CC

## (undated) DEVICE — 3M™ TEGADERM™ TRANSPARENT FILM DRESSING, 1626W, 4 IN X 4-3/4 IN (10 CM X 12 CM), 50 EACH/CARTON, 4 CARTON/CASE: Brand: 3M™ TEGADERM™

## (undated) DEVICE — HARMONIC FOCUS SHEARS 9CM LENGTH + ADAPTIVE TISSUE TECHNOLOGY FOR USE WITH BLUE HAND PIECE ONLY: Brand: HARMONIC FOCUS

## (undated) DEVICE — 1010 S-DRAPE TOWEL DRAPE 10/BX: Brand: STERI-DRAPE™

## (undated) DEVICE — HEAD AND NECK CDS-LF: Brand: MEDLINE INDUSTRIES, INC.

## (undated) DEVICE — SUTURE SILK 2-0

## (undated) DEVICE — LIGHT HANDLE

## (undated) DEVICE — KENDALL SCD EXPRESS SLEEVES, KNEE LENGTH, MEDIUM: Brand: KENDALL SCD

## (undated) DEVICE — ELECTRODE 8227410 PAIRED 2 CH SET ROHS

## (undated) DEVICE — CAUTERY NEEDLE 2IN INS E1465

## (undated) DEVICE — DRAIN SILICONE RND 1/8

## (undated) DEVICE — 3M(TM) TEGADERM(TM) TRANSPARENT FILM DRESSING FRAME STYLE 9505W: Brand: 3M™ TEGADERM™

## (undated) DEVICE — #12 STERILE BLADE: Brand: POLYMER COATED BLADES

## (undated) DEVICE — UNDYED BRAIDED (POLYGLACTIN 910), SYNTHETIC ABSORBABLE SUTURE: Brand: COATED VICRYL

## (undated) DEVICE — PREMIUM WET SKIN PREP TRAY: Brand: MEDLINE INDUSTRIES, INC.

## (undated) DEVICE — PROBE 8225101 5PK STD PRASS FL TIP ROHS

## (undated) DEVICE — SPONGE: SPECIALTY PEANUT XR 100/CS: Brand: MEDICAL ACTION INDUSTRIES

## (undated) DEVICE — STERILE POLYISOPRENE POWDER-FREE SURGICAL GLOVES: Brand: PROTEXIS

## (undated) DEVICE — SUTURE SILK 2-0 X-1

## (undated) DEVICE — SOL  .9 1000ML BTL

## (undated) DEVICE — 3M™ STERI-STRIP™ REINFORCED ADHESIVE SKIN CLOSURES, R1547, 1/2 IN X 4 IN (12 MM X 100 MM), 6 STRIPS/ENVELOPE: Brand: 3M™ STERI-STRIP™

## (undated) DEVICE — SUTURE VICRYL 3-0 SH

## (undated) DEVICE — SUTURE PROLENE 4-0 PS-2

## (undated) NOTE — LETTER
Date: 2021    Patient Name: Celia Langedaquan CHARLES.O.B.: 1970          To Whom it may concern:     This letter has been written at the patient's request. The above patient was seen at the [facility] for treatment of a medical conditio

## (undated) NOTE — MR AVS SNAPSHOT
[place]  [address]  [address] [facility] [address] [phone]               Thank you for choosing us for your health care visit with Melania Sousa NP.   We are glad to serve you and happy to provide you with this sum Support Staff. Remember, MyChart is NOT to be used for urgent needs. For medical emergencies, dial 911.            Visit [facility] online at  [email or web address][email or web address]

## (undated) NOTE — LETTER
[facility] [address]  Dept: [phone]  Dept Fax: [phone]      July 10, 2017    Patient: Alon Mckeon   Date of Visit: 7/10/2017       To Whom It May Concern:     Wisam Mclaughlin was seen and toan

## (undated) NOTE — LETTER
11/25/20        [place] [id number]      Dear Sarita Whitaker,    Our records indicate that you have outstanding lab work and or testing that was ordered for you and has not yet been completed:  Orders Placed This Encounter

## (undated) NOTE — MR AVS SNAPSHOT
[place]  [address]  [address] [facility] [id number] [phone]               Thank you for choosing us for your health care visit with Janet Boswell NP.   We are glad to serve you and happy to provide you with this sum Take 1 capsule by mouth every morning.    Commonly known as:  DYAZIDE                Where to Get Your Medications      These medications were sent to [facility], [address] [facility] [address]  [address] [phone], [phone]  [address] [facility]

## (undated) NOTE — MR AVS SNAPSHOT
[place]  [address] [facility]  [address] [facility] [address] [phone]               Thank you for choosing us for your health care visit with Anthoyn Flood NP.   We are glad to serve you and happy to provide you with this sum visit,  view other health information, and more. To sign up or find more information, go to [email or web address] and click on the Sign Up Now link in the [employer] box.      Enter your [employer] Activation Code exactly as it appears below along with yo Don’t forget strength training with weights and resistance Set goals and track your progress   You don’t need to join a gym. Home exercises work great.  Put more priority on exercise in your life                    Visit [facility] online at